# Patient Record
Sex: FEMALE | Race: WHITE | NOT HISPANIC OR LATINO | Employment: FULL TIME | URBAN - METROPOLITAN AREA
[De-identification: names, ages, dates, MRNs, and addresses within clinical notes are randomized per-mention and may not be internally consistent; named-entity substitution may affect disease eponyms.]

---

## 2017-02-01 ENCOUNTER — APPOINTMENT (OUTPATIENT)
Dept: WOMENS IMAGING | Facility: HOSPITAL | Age: 57
End: 2017-02-01

## 2017-02-01 PROCEDURE — 77067 SCR MAMMO BI INCL CAD: CPT | Performed by: RADIOLOGY

## 2018-02-19 ENCOUNTER — OFFICE (AMBULATORY)
Dept: URBAN - METROPOLITAN AREA CLINIC 66 | Facility: CLINIC | Age: 58
End: 2018-02-19

## 2018-02-19 VITALS
DIASTOLIC BLOOD PRESSURE: 90 MMHG | SYSTOLIC BLOOD PRESSURE: 153 MMHG | TEMPERATURE: 98.9 F | WEIGHT: 177 LBS | HEIGHT: 65 IN | HEART RATE: 98 BPM

## 2018-02-19 DIAGNOSIS — Q45.3 OTHER CONGENITAL MALFORMATIONS OF PANCREAS AND PANCREATIC DU: ICD-10-CM

## 2018-02-19 DIAGNOSIS — K58.9 IRRITABLE BOWEL SYNDROME WITHOUT DIARRHEA: ICD-10-CM

## 2018-02-19 DIAGNOSIS — K64.9 UNSPECIFIED HEMORRHOIDS: ICD-10-CM

## 2018-02-19 PROCEDURE — 99214 OFFICE O/P EST MOD 30 MIN: CPT

## 2018-02-21 RX ORDER — PRAMOXINE HYDROCHLORIDE HYDROCORTISONE ACETATE 100; 100 MG/10G; MG/10G
AEROSOL, FOAM TOPICAL
Qty: 21 | Refills: 10 | Status: COMPLETED
Start: 2018-02-21 | End: 2022-05-04

## 2018-04-13 ENCOUNTER — APPOINTMENT (OUTPATIENT)
Dept: WOMENS IMAGING | Facility: HOSPITAL | Age: 58
End: 2018-04-13

## 2018-04-13 PROCEDURE — 77067 SCR MAMMO BI INCL CAD: CPT | Performed by: RADIOLOGY

## 2018-04-13 PROCEDURE — 77063 BREAST TOMOSYNTHESIS BI: CPT | Performed by: RADIOLOGY

## 2018-08-01 ENCOUNTER — OFFICE (AMBULATORY)
Dept: URBAN - METROPOLITAN AREA CLINIC 66 | Facility: CLINIC | Age: 58
End: 2018-08-01

## 2018-08-01 VITALS
HEART RATE: 88 BPM | WEIGHT: 167 LBS | HEIGHT: 65 IN | SYSTOLIC BLOOD PRESSURE: 119 MMHG | DIASTOLIC BLOOD PRESSURE: 76 MMHG

## 2018-08-01 DIAGNOSIS — K86.1 OTHER CHRONIC PANCREATITIS: ICD-10-CM

## 2018-08-01 DIAGNOSIS — K58.0 IRRITABLE BOWEL SYNDROME WITH DIARRHEA: ICD-10-CM

## 2018-08-01 PROCEDURE — 99213 OFFICE O/P EST LOW 20 MIN: CPT

## 2019-01-03 ENCOUNTER — OFFICE (AMBULATORY)
Dept: URBAN - METROPOLITAN AREA CLINIC 66 | Facility: CLINIC | Age: 59
End: 2019-01-03

## 2019-01-03 VITALS
DIASTOLIC BLOOD PRESSURE: 74 MMHG | WEIGHT: 182 LBS | SYSTOLIC BLOOD PRESSURE: 114 MMHG | HEIGHT: 65 IN | HEART RATE: 97 BPM

## 2019-01-03 DIAGNOSIS — K58.0 IRRITABLE BOWEL SYNDROME WITH DIARRHEA: ICD-10-CM

## 2019-01-03 DIAGNOSIS — K86.1 OTHER CHRONIC PANCREATITIS: ICD-10-CM

## 2019-01-03 PROCEDURE — 99213 OFFICE O/P EST LOW 20 MIN: CPT

## 2019-04-26 ENCOUNTER — APPOINTMENT (OUTPATIENT)
Dept: WOMENS IMAGING | Facility: HOSPITAL | Age: 59
End: 2019-04-26

## 2019-04-26 PROCEDURE — 77067 SCR MAMMO BI INCL CAD: CPT | Performed by: RADIOLOGY

## 2019-04-26 PROCEDURE — 77063 BREAST TOMOSYNTHESIS BI: CPT | Performed by: RADIOLOGY

## 2019-07-10 ENCOUNTER — OFFICE (AMBULATORY)
Dept: URBAN - METROPOLITAN AREA CLINIC 66 | Facility: CLINIC | Age: 59
End: 2019-07-10

## 2019-07-10 VITALS
SYSTOLIC BLOOD PRESSURE: 129 MMHG | DIASTOLIC BLOOD PRESSURE: 82 MMHG | HEIGHT: 65 IN | HEART RATE: 93 BPM | WEIGHT: 192 LBS

## 2019-07-10 DIAGNOSIS — K86.1 OTHER CHRONIC PANCREATITIS: ICD-10-CM

## 2019-07-10 DIAGNOSIS — K58.0 IRRITABLE BOWEL SYNDROME WITH DIARRHEA: ICD-10-CM

## 2019-07-10 PROCEDURE — 99214 OFFICE O/P EST MOD 30 MIN: CPT

## 2019-12-13 ENCOUNTER — OFFICE (AMBULATORY)
Dept: URBAN - METROPOLITAN AREA CLINIC 66 | Facility: CLINIC | Age: 59
End: 2019-12-13

## 2019-12-13 VITALS
HEIGHT: 65 IN | DIASTOLIC BLOOD PRESSURE: 84 MMHG | SYSTOLIC BLOOD PRESSURE: 125 MMHG | HEART RATE: 84 BPM | WEIGHT: 189 LBS

## 2019-12-13 DIAGNOSIS — K86.1 OTHER CHRONIC PANCREATITIS: ICD-10-CM

## 2019-12-13 DIAGNOSIS — K21.9 GASTRO-ESOPHAGEAL REFLUX DISEASE WITHOUT ESOPHAGITIS: ICD-10-CM

## 2019-12-13 DIAGNOSIS — K58.0 IRRITABLE BOWEL SYNDROME WITH DIARRHEA: ICD-10-CM

## 2019-12-13 PROCEDURE — 99213 OFFICE O/P EST LOW 20 MIN: CPT

## 2020-02-27 ENCOUNTER — OFFICE (AMBULATORY)
Dept: URBAN - METROPOLITAN AREA CLINIC 64 | Facility: CLINIC | Age: 60
End: 2020-02-27

## 2020-02-27 VITALS
HEIGHT: 65 IN | DIASTOLIC BLOOD PRESSURE: 98 MMHG | HEART RATE: 95 BPM | SYSTOLIC BLOOD PRESSURE: 165 MMHG | WEIGHT: 187 LBS

## 2020-02-27 DIAGNOSIS — K86.1 OTHER CHRONIC PANCREATITIS: ICD-10-CM

## 2020-02-27 DIAGNOSIS — R19.7 DIARRHEA, UNSPECIFIED: ICD-10-CM

## 2020-02-27 DIAGNOSIS — K21.9 GASTRO-ESOPHAGEAL REFLUX DISEASE WITHOUT ESOPHAGITIS: ICD-10-CM

## 2020-02-27 PROCEDURE — 99214 OFFICE O/P EST MOD 30 MIN: CPT | Performed by: INTERNAL MEDICINE

## 2020-02-28 ENCOUNTER — HOSPITAL ENCOUNTER (OUTPATIENT)
Facility: HOSPITAL | Age: 60
Setting detail: HOSPITAL OUTPATIENT SURGERY
End: 2020-02-28
Attending: INTERNAL MEDICINE | Admitting: INTERNAL MEDICINE

## 2020-05-11 RX ORDER — DIVALPROEX SODIUM 250 MG/1
150 TABLET, DELAYED RELEASE ORAL
COMMUNITY
End: 2022-10-19 | Stop reason: SDUPTHER

## 2020-05-11 RX ORDER — ESTRADIOL 1 MG/1
1 TABLET ORAL DAILY
COMMUNITY

## 2020-05-11 RX ORDER — ESCITALOPRAM OXALATE 20 MG/1
20 TABLET ORAL DAILY
COMMUNITY

## 2020-05-11 RX ORDER — DIAPER,BRIEF,INFANT-TODD,DISP
1 EACH MISCELLANEOUS 2 TIMES DAILY
COMMUNITY

## 2020-05-11 RX ORDER — SACCHAROMYCES BOULARDII 250 MG
250 CAPSULE ORAL 3 TIMES DAILY
COMMUNITY

## 2020-05-11 RX ORDER — PANTOPRAZOLE SODIUM 40 MG/1
40 TABLET, DELAYED RELEASE ORAL DAILY
COMMUNITY

## 2020-05-11 RX ORDER — DICYCLOMINE HYDROCHLORIDE 10 MG/1
10 CAPSULE ORAL
COMMUNITY

## 2020-05-11 RX ORDER — CLONAZEPAM 0.5 MG/1
0.5 TABLET ORAL 2 TIMES DAILY PRN
COMMUNITY

## 2020-05-11 RX ORDER — PRAMIPEXOLE DIHYDROCHLORIDE 0.25 MG/1
0.25 TABLET ORAL 2 TIMES DAILY
COMMUNITY

## 2020-05-11 RX ORDER — DOXYCYCLINE HYCLATE 50 MG/1
50 CAPSULE ORAL DAILY
COMMUNITY
End: 2022-10-19

## 2020-05-11 RX ORDER — LEVOTHYROXINE SODIUM 0.1 MG/1
100 TABLET ORAL DAILY
COMMUNITY

## 2020-05-11 RX ORDER — MAGNESIUM GLUCONATE 27 MG(500)
27 TABLET ORAL 2 TIMES DAILY
COMMUNITY

## 2020-05-12 RX ORDER — DEXTROAMPHETAMINE SACCHARATE, AMPHETAMINE ASPARTATE, DEXTROAMPHETAMINE SULFATE AND AMPHETAMINE SULFATE 5; 5; 5; 5 MG/1; MG/1; MG/1; MG/1
20 TABLET ORAL 2 TIMES DAILY
COMMUNITY

## 2020-05-12 RX ORDER — GLUCOSAMINE/D3/BOSWELLIA SERRA 1500MG-400
TABLET ORAL
COMMUNITY

## 2020-05-12 RX ORDER — SODIUM PHOSPHATE,MONO-DIBASIC 19G-7G/118
2 ENEMA (ML) RECTAL
COMMUNITY

## 2020-05-12 RX ORDER — MULTIVIT WITH MINERALS/LUTEIN
1000 TABLET ORAL DAILY
COMMUNITY

## 2020-05-15 ENCOUNTER — APPOINTMENT (OUTPATIENT)
Dept: LAB | Facility: HOSPITAL | Age: 60
End: 2020-05-15

## 2020-05-15 ENCOUNTER — LAB (OUTPATIENT)
Dept: LAB | Facility: HOSPITAL | Age: 60
End: 2020-05-15

## 2020-05-15 PROCEDURE — C9803 HOPD COVID-19 SPEC COLLECT: HCPCS

## 2020-05-15 PROCEDURE — U0004 COV-19 TEST NON-CDC HGH THRU: HCPCS

## 2020-05-16 LAB
REF LAB TEST METHOD: NORMAL
SARS-COV-2 RNA RESP QL NAA+PROBE: NOT DETECTED

## 2020-05-18 ENCOUNTER — ON CAMPUS - OUTPATIENT (AMBULATORY)
Dept: URBAN - METROPOLITAN AREA HOSPITAL 85 | Facility: HOSPITAL | Age: 60
End: 2020-05-18

## 2020-05-18 ENCOUNTER — ANESTHESIA EVENT (OUTPATIENT)
Dept: GASTROENTEROLOGY | Facility: HOSPITAL | Age: 60
End: 2020-05-18

## 2020-05-18 ENCOUNTER — HOSPITAL ENCOUNTER (OUTPATIENT)
Facility: HOSPITAL | Age: 60
Setting detail: HOSPITAL OUTPATIENT SURGERY
Discharge: HOME | End: 2020-05-18
Attending: INTERNAL MEDICINE | Admitting: INTERNAL MEDICINE

## 2020-05-18 ENCOUNTER — ANESTHESIA (OUTPATIENT)
Dept: GASTROENTEROLOGY | Facility: HOSPITAL | Age: 60
End: 2020-05-18

## 2020-05-18 VITALS
RESPIRATION RATE: 15 BRPM | HEIGHT: 66 IN | TEMPERATURE: 98.7 F | BODY MASS INDEX: 34.51 KG/M2 | WEIGHT: 214.73 LBS | DIASTOLIC BLOOD PRESSURE: 70 MMHG | SYSTOLIC BLOOD PRESSURE: 121 MMHG | OXYGEN SATURATION: 100 % | HEART RATE: 67 BPM

## 2020-05-18 DIAGNOSIS — K86.1 OTHER CHRONIC PANCREATITIS: ICD-10-CM

## 2020-05-18 DIAGNOSIS — K29.50 UNSPECIFIED CHRONIC GASTRITIS WITHOUT BLEEDING: ICD-10-CM

## 2020-05-18 DIAGNOSIS — K86.1 CHRONIC PANCREATITIS (HCC): ICD-10-CM

## 2020-05-18 DIAGNOSIS — K21.9 GASTRO-ESOPHAGEAL REFLUX DISEASE WITHOUT ESOPHAGITIS: ICD-10-CM

## 2020-05-18 DIAGNOSIS — K21.9 GERD (GASTROESOPHAGEAL REFLUX DISEASE): ICD-10-CM

## 2020-05-18 PROCEDURE — 88305 TISSUE EXAM BY PATHOLOGIST: CPT | Performed by: INTERNAL MEDICINE

## 2020-05-18 PROCEDURE — 25010000002 PROPOFOL 10 MG/ML EMULSION: Performed by: ANESTHESIOLOGY

## 2020-05-18 PROCEDURE — 43239 EGD BIOPSY SINGLE/MULTIPLE: CPT | Performed by: INTERNAL MEDICINE

## 2020-05-18 RX ORDER — PROPOFOL 10 MG/ML
VIAL (ML) INTRAVENOUS AS NEEDED
Status: DISCONTINUED | OUTPATIENT
Start: 2020-05-18 | End: 2020-05-18 | Stop reason: SURG

## 2020-05-18 RX ORDER — SODIUM CHLORIDE 0.9 % (FLUSH) 0.9 %
10 SYRINGE (ML) INJECTION AS NEEDED
Status: DISCONTINUED | OUTPATIENT
Start: 2020-05-18 | End: 2020-05-18 | Stop reason: HOSPADM

## 2020-05-18 RX ORDER — SODIUM CHLORIDE 0.9 % (FLUSH) 0.9 %
10 SYRINGE (ML) INJECTION EVERY 12 HOURS SCHEDULED
Status: DISCONTINUED | OUTPATIENT
Start: 2020-05-18 | End: 2020-05-18 | Stop reason: HOSPADM

## 2020-05-18 RX ORDER — SODIUM CHLORIDE 9 MG/ML
9 INJECTION, SOLUTION INTRAVENOUS CONTINUOUS
Status: DISCONTINUED | OUTPATIENT
Start: 2020-05-18 | End: 2020-05-18 | Stop reason: HOSPADM

## 2020-05-18 RX ORDER — SODIUM CHLORIDE 0.9 % (FLUSH) 0.9 %
3 SYRINGE (ML) INJECTION EVERY 12 HOURS SCHEDULED
Status: DISCONTINUED | OUTPATIENT
Start: 2020-05-18 | End: 2020-05-18 | Stop reason: HOSPADM

## 2020-05-18 RX ORDER — ONDANSETRON 2 MG/ML
4 INJECTION INTRAMUSCULAR; INTRAVENOUS ONCE AS NEEDED
Status: DISCONTINUED | OUTPATIENT
Start: 2020-05-18 | End: 2020-05-18 | Stop reason: HOSPADM

## 2020-05-18 RX ADMIN — PROPOFOL 150 MG: 10 INJECTION, EMULSION INTRAVENOUS at 11:03

## 2020-05-18 RX ADMIN — PROPOFOL 20 MG: 10 INJECTION, EMULSION INTRAVENOUS at 11:08

## 2020-05-18 RX ADMIN — SODIUM CHLORIDE 9 ML/HR: 0.9 INJECTION, SOLUTION INTRAVENOUS at 09:16

## 2020-05-18 RX ADMIN — PROPOFOL 50 MG: 10 INJECTION, EMULSION INTRAVENOUS at 11:05

## 2020-05-18 NOTE — ANESTHESIA POSTPROCEDURE EVALUATION
Patient: Mari Whitman    Procedure Summary     Date:  05/18/20 Room / Location:  [facility] ENDOSCOPY 1 / [facility] ENDOSCOPY    Anesthesia Start:  1057 Anesthesia Stop:  1111    Procedure:  ESOPHAGOGASTRODUODENOSCOPY with biopsy x 2 areas (N/A ) Diagnosis:       Chronic pancreatitis (CMS/HCC)      GERD (gastroesophageal reflux disease)      (CHRONIC PANCREATITIS, GERD)    Surgeon:  Brad Bishop MD Provider:  Graham Marsh MD    Anesthesia Type:  MAC ASA Status:  3          Anesthesia Type: MAC    Vitals  Vitals Value Taken Time   BP     Temp     Pulse 69 5/18/2020 11:13 AM   Resp     SpO2 98 % 5/18/2020 11:13 AM   Vitals shown include unvalidated device data.        Post Anesthesia Care and Evaluation    Patient location during evaluation: PACU  Patient participation: complete - patient participated  Level of consciousness: awake  Pain scale: See nurse's notes for pain score.  Pain management: adequate  Airway patency: patent  Anesthetic complications: No anesthetic complications  PONV Status: none  Cardiovascular status: acceptable  Respiratory status: acceptable  Hydration status: acceptable    Comments: Patient seen and examined postoperatively; vital signs stable; SpO2 greater than or equal to 90%; cardiopulmonary status stable; nausea/vomiting adequately controlled; pain adequately controlled; no apparent anesthesia complications; patient discharged from anesthesia care when discharge criteria were met

## 2020-05-18 NOTE — H&P
GI CONSULT  NOTE:    Referring Provider:    Kiley Cobian MD  [unfilled]    Chief complaint: <principal problem not specified>    Subjective .       Pre op diagnosis  CHRONIC PANCREATITIS, GERD      History of present illness:      Mari Whitman is a 59 y.o. female who presents today for Procedure(s):  ESOPHAGOGASTRODUODENOSCOPY for the indications listed below.     The updated Patient Profile was reviewed prior to the procedure, in conjunction with the Physical Exam, including medical conditions, surgical procedures, medications, allergies, family history and social history.     Pre-operatively, I reviewed the indication(s) for the procedure, the risks of the procedure [including but not limited to: unexpected bleeding possibly requiring hospitalization and/or unplanned repeat procedures, perforation possibly requiring surgical treatment, missed lesions and complications of sedation/MAC (also explained by anesthesia staff)].     I have evaluated the patient for risks associated with the planned anesthesia and the procedure to be performed and find the patient an acceptable candidate for IV sedation.    Multiple opportunities were provided for any questions or concerns, and all questions were answered satisfactorily before any anesthesia was administered. We will proceed with the planned procedure.    Past Medical History:  Past Medical History:   Diagnosis Date   • Borderline diabetes    • Cancer (CMS/HCC)     cervix   • GERD (gastroesophageal reflux disease)    • Idiopathic thrombocytopenic purpura (ITP) (CMS/HCC)    • Pancreatic divisum    • PONV (postoperative nausea and vomiting)    • Sleep apnea        Past Surgical History:  Past Surgical History:   Procedure Laterality Date   • ANAL SPHINCTEROPLASTY     • CHOLECYSTECTOMY     • COLONOSCOPY     • ERCP     • HYSTERECTOMY      2000   • NASAL SEPTUM SURGERY         Social History:  Social History     Tobacco Use   • Smoking status: Former Smoker      Last attempt to quit: 1985     Years since quittin.4   • Smokeless tobacco: Current User   Substance Use Topics   • Alcohol use: Not Currently   • Drug use: Never       Family History:  History reviewed. No pertinent family history.    Medications:  Medications Prior to Admission   Medication Sig Dispense Refill Last Dose   • Acetylcysteine (N-ACETYL-L-CYSTEINE PO) Take 1,200 mg by mouth Daily.   2020 at Unknown time   • amphetamine-dextroamphetamine (ADDERALL) 20 MG tablet Take 20 mg by mouth 2 (Two) Times a Day.   2020 at Unknown time   • Ascorbic Acid (C-500/CLARITZA HIPS PO) Take 1 capsule by mouth Daily.   2020   • Biotin 71584 MCG tablet Take  by mouth.   2020   • Ca Phosphate-Cholecalciferol (CALTRATE GUMMY BITES PO) Take 2 tablets by mouth every night at bedtime.   2020   • clonazePAM (KlonoPIN) 0.5 MG tablet Take 0.5 mg by mouth 2 (Two) Times a Day As Needed for Seizures. May take dos   2020 at Unknown time   • dicyclomine (BENTYL) 10 MG capsule Take 10 mg by mouth 4 (Four) Times a Day Before Meals & at Bedtime.   2020 at Unknown time   • divalproex (DEPAKOTE) 250 MG DR tablet Take 150 mg by mouth every night at bedtime.   2020 at Unknown time   • doxycycline (VIBRAMYCIN) 50 MG capsule Take 50 mg by mouth Daily.   2020 at Unknown time   • escitalopram (LEXAPRO) 20 MG tablet Take 20 mg by mouth Daily. Take dos   2020 at Unknown time   • estradiol (ESTRACE) 1 MG tablet Take 1 mg by mouth Daily.   2020 at Unknown time   • Ginger, Zingiber officinalis, (GINGER PO) Take 12 mg by mouth Daily.   2020   • glucosamine-chondroitin 500-400 MG capsule capsule Take 2 capsules by mouth every night at bedtime.   2020   • hydrocortisone 0.5 % cream Apply 1 application topically to the appropriate area as directed 2 (Two) Times a Day.   Past Month at Unknown time   • levothyroxine (SYNTHROID, LEVOTHROID) 100 MCG tablet Take 100 mcg by mouth Daily. Take dos    5/18/2020 at Unknown time   • magnesium gluconate (MAGONATE) 500 MG tablet Take 27 mg by mouth 2 (Two) Times a Day.   5/17/2020   • methylcellulose oral powder Take 5 g by mouth 4 (Four) Times a Day.   5/17/2020 at Unknown time   • Multiple Vitamins-Minerals (CENTRUM MULTIGUMMIES PO) Take 2 tablets by mouth every night at bedtime.   5/16/2020   • Multiple Vitamins-Minerals (ECHINACEA ACZ PO) Take 25 mg by mouth Daily.   5/16/2020   • Pancrelipase, Lip-Prot-Amyl, (CREON PO) Take 3,600 mg by mouth Daily.   5/17/2020 at Unknown time   • pantoprazole (PROTONIX) 40 MG EC tablet Take 40 mg by mouth Daily. Ok dos   5/17/2020 at Unknown time   • Peppermint Oil (IBGARD PO) Take 3 capsules by mouth Daily.   5/16/2020   • pramipexole (MIRAPEX) 0.25 MG tablet Take 0.25 mg by mouth 2 (Two) Times a Day.   5/17/2020 at Unknown time   • progesterone (PROMETRIUM) 100 MG capsule Take 100 mg by mouth Daily.   5/18/2020 at Unknown time   • saccharomyces boulardii (FLORASTOR) 250 MG capsule Take 250 mg by mouth 3 (Three) Times a Day.   5/18/2020 at Unknown time   • Suvorexant (Belsomra) 20 MG tablet Take 1 tablet by mouth At Night As Needed.   Past Week at Unknown time   • TURMERIC PO Take 270 mg by mouth Daily.   5/16/2020   • vitamin E 1000 UNIT capsule Take 1,000 Units by mouth Daily.   5/16/2020   • Zinc Sulfate (ZINC 15 PO) Take 1 tablet by mouth Daily.   5/16/2020       Scheduled Meds:   Continuous Infusions:  sodium chloride 9 mL/hr Last Rate: 9 mL/hr (05/18/20 0916)     PRN Meds:.    ALLERGIES:  Codeine; Droperidol; and Hydrocodone    ROS:  The following systems were reviewed and negative;  Constitution:  No fevers, chills, no unintentional weight loss  Skin: no rash, no jaundice  Eyes:  No blurry vision, no eye pain  HENT:  No change in hearing or smell  Resp:  No dyspnea or cough  CV:  No chest pain or palpitations  :  No dysuria, hematuria  Musculoskeletal:  No leg cramps or arthralgias  Neuro:  No tremor, no  "numbness  Psych:  No depression or confsuion    Objective     Vital Signs:   Vitals:    05/11/20 1042 05/18/20 0855   BP:  142/77   Pulse:  72   Resp:  13   Temp:  98.7 °F (37.1 °C)   TempSrc:  Oral   SpO2:  96%   Weight: 83.9 kg (185 lb) 97.4 kg (214 lb 11.7 oz)   Height: 167.6 cm (66\") 167.6 cm (66\")       Physical Exam:       General Appearance:    Awake and alert, in no acute distress   Head:    Normocephalic, without obvious abnormality, atraumatic   Throat:   No oral lesions, no thrush, oral mucosa moist   Lungs:     respirations regular, even and unlabored   Skin:   No rash, no jaundice       Results Review:  Lab Results (last 24 hours)     ** No results found for the last 24 hours. **          Imaging Results (Last 24 Hours)     ** No results found for the last 24 hours. **           I reviewed the patient's labs and imaging.    ASSESSMENT AND PLAN:      Active Problems:    * No active hospital problems. *       Procedure(s):  ESOPHAGOGASTRODUODENOSCOPY      I discussed the patients findings and my recommendations with the patient.    Brad Bishop MD  05/18/20  11:00              "

## 2020-05-18 NOTE — OP NOTE
ESOPHAGOGASTRODUODENOSCOPY Procedure Report    Patient Name:  Mari Whitman  YOB: 1960    Date of Surgery:  5/18/2020     Pre-Op Diagnosis:  CHRONIC PANCREATITIS, GERD       Post-Op Diagnosis Codes:  Gastritis    Procedure/CPT® Codes:      Procedure(s):  ESOPHAGOGASTRODUODENOSCOPY with biopsy x 2 areas    Staff:  Surgeon(s):  Brad Bishop MD      Anesthesia: Monitored Anesthesia Care    Description of Procedure:  A description of the procedure as well as risks, benefits and alternative methods were explained to the patient who voiced understanding and signed the corresponding consent form. A physical exam was performed and vital signs were monitored throughout the procedure.    An upper GI endoscope was placed into the mouth and proceeded through the esophagus, stomach and second portion of the duodenum without difficulty. The scope was then retroflexed and the fundus was visualized. The procedure was not difficult and there were no immediate complications.    Impression:  1.  Normal esophageal mucosa entire esophagus  2.  Erythema and erosions in the distal gastric body, consistent with gastritis, cold forcep biopsies were taken and sent for histopathology and H. pylori  3.  Normal duodenal mucosa visualized to D3, cold forcep biopsies were taken and sent for histopathology    Recommendations:  Follow-up biopsy results  Treat H. pylori if positive  Follow-up in clinic      Brad Bishop MD     Date: 5/18/2020    Time: 11:31

## 2020-05-18 NOTE — ANESTHESIA PREPROCEDURE EVALUATION
Anesthesia Evaluation     Patient summary reviewed and Nursing notes reviewed   history of anesthetic complications: PONV  NPO Solid Status: > 8 hours  NPO Liquid Status: > 8 hours           Airway   Mallampati: II  TM distance: >3 FB  Neck ROM: full  No difficulty expected  Dental - normal exam     Pulmonary - normal exam   (+) sleep apnea on CPAP,   Cardiovascular - negative cardio ROS and normal exam        Neuro/Psych- negative ROS  GI/Hepatic/Renal/Endo    (+) obesity,  GERD,      Musculoskeletal (-) negative ROS    Abdominal  - normal exam    Bowel sounds: normal.   Substance History - negative use     OB/GYN negative ob/gyn ROS         Other   blood dyscrasia thrombocytopenia,                     Anesthesia Plan    ASA 3     MAC     intravenous induction     Anesthetic plan, all risks, benefits, and alternatives have been provided, discussed and informed consent has been obtained with: patient.

## 2020-05-18 NOTE — DISCHARGE INSTRUCTIONS
A responsible adult should stay with you and you should rest quietly for the rest of the day.    Do not drink alcohol, drive, operate any heavy machinery or power tools or make any legal/important decisions for the next 24 hours.     Progress your diet as tolerated.  If you begin to experience severe pain, increased shortness of breath, racing heartbeat or a fever above 101 F, seek immediate medical attention.     Follow up with MD as instructed. Call office for results in 3 to 5 days if needed.[phone]    Follow-up biopsy results  Treat H. pylori if positive  Follow-up in clinic

## 2020-05-19 LAB
LAB AP CASE REPORT: NORMAL
PATH REPORT.FINAL DX SPEC: NORMAL
PATH REPORT.GROSS SPEC: NORMAL

## 2020-05-28 ENCOUNTER — OFFICE (AMBULATORY)
Dept: URBAN - METROPOLITAN AREA CLINIC 64 | Facility: CLINIC | Age: 60
End: 2020-05-28

## 2020-05-28 VITALS
DIASTOLIC BLOOD PRESSURE: 97 MMHG | HEART RATE: 93 BPM | SYSTOLIC BLOOD PRESSURE: 143 MMHG | HEIGHT: 65 IN | WEIGHT: 190 LBS

## 2020-05-28 DIAGNOSIS — K86.1 OTHER CHRONIC PANCREATITIS: ICD-10-CM

## 2020-05-28 DIAGNOSIS — K21.9 GASTRO-ESOPHAGEAL REFLUX DISEASE WITHOUT ESOPHAGITIS: ICD-10-CM

## 2020-05-28 DIAGNOSIS — R19.7 DIARRHEA, UNSPECIFIED: ICD-10-CM

## 2020-05-28 DIAGNOSIS — K30 FUNCTIONAL DYSPEPSIA: ICD-10-CM

## 2020-05-28 PROCEDURE — 99213 OFFICE O/P EST LOW 20 MIN: CPT | Performed by: INTERNAL MEDICINE

## 2020-05-28 RX ORDER — SIMETHICONE 125 MG/1
TABLET, CHEWABLE ORAL
Qty: 90 | Refills: 1 | Status: ACTIVE

## 2020-07-09 ENCOUNTER — APPOINTMENT (OUTPATIENT)
Dept: WOMENS IMAGING | Facility: HOSPITAL | Age: 60
End: 2020-07-09

## 2020-07-09 PROCEDURE — 77063 BREAST TOMOSYNTHESIS BI: CPT | Performed by: RADIOLOGY

## 2020-07-09 PROCEDURE — 77067 SCR MAMMO BI INCL CAD: CPT | Performed by: RADIOLOGY

## 2021-04-15 ENCOUNTER — OFFICE (AMBULATORY)
Dept: URBAN - METROPOLITAN AREA CLINIC 64 | Facility: CLINIC | Age: 61
End: 2021-04-15

## 2021-04-15 VITALS
HEIGHT: 65 IN | HEART RATE: 107 BPM | SYSTOLIC BLOOD PRESSURE: 142 MMHG | DIASTOLIC BLOOD PRESSURE: 94 MMHG | WEIGHT: 195 LBS

## 2021-04-15 DIAGNOSIS — Q45.3 OTHER CONGENITAL MALFORMATIONS OF PANCREAS AND PANCREATIC DU: ICD-10-CM

## 2021-04-15 DIAGNOSIS — R11.2 NAUSEA WITH VOMITING, UNSPECIFIED: ICD-10-CM

## 2021-04-15 DIAGNOSIS — K30 FUNCTIONAL DYSPEPSIA: ICD-10-CM

## 2021-04-15 DIAGNOSIS — K86.1 OTHER CHRONIC PANCREATITIS: ICD-10-CM

## 2021-04-15 DIAGNOSIS — K58.0 IRRITABLE BOWEL SYNDROME WITH DIARRHEA: ICD-10-CM

## 2021-04-15 PROCEDURE — 99214 OFFICE O/P EST MOD 30 MIN: CPT | Performed by: INTERNAL MEDICINE

## 2021-04-15 RX ORDER — DICYCLOMINE HYDROCHLORIDE 20 MG/1
TABLET ORAL
Qty: 60 | Refills: 1 | Status: ACTIVE

## 2021-04-15 RX ORDER — SIMETHICONE 125 MG/1
TABLET, CHEWABLE ORAL
Qty: 90 | Refills: 1 | Status: ACTIVE

## 2022-03-15 ENCOUNTER — APPOINTMENT (OUTPATIENT)
Dept: WOMENS IMAGING | Facility: HOSPITAL | Age: 62
End: 2022-03-15

## 2022-03-15 PROCEDURE — 77067 SCR MAMMO BI INCL CAD: CPT | Performed by: RADIOLOGY

## 2022-03-15 PROCEDURE — 77063 BREAST TOMOSYNTHESIS BI: CPT | Performed by: RADIOLOGY

## 2022-05-04 ENCOUNTER — OFFICE (AMBULATORY)
Dept: URBAN - METROPOLITAN AREA CLINIC 64 | Facility: CLINIC | Age: 62
End: 2022-05-04

## 2022-05-04 VITALS
WEIGHT: 196 LBS | HEIGHT: 65 IN | SYSTOLIC BLOOD PRESSURE: 163 MMHG | HEART RATE: 97 BPM | DIASTOLIC BLOOD PRESSURE: 104 MMHG

## 2022-05-04 DIAGNOSIS — K58.0 IRRITABLE BOWEL SYNDROME WITH DIARRHEA: ICD-10-CM

## 2022-05-04 PROCEDURE — 99214 OFFICE O/P EST MOD 30 MIN: CPT | Performed by: INTERNAL MEDICINE

## 2022-06-09 ENCOUNTER — OFFICE (AMBULATORY)
Dept: URBAN - METROPOLITAN AREA PATHOLOGY 4 | Facility: PATHOLOGY | Age: 62
End: 2022-06-09
Payer: COMMERCIAL

## 2022-06-09 ENCOUNTER — ON CAMPUS - OUTPATIENT (AMBULATORY)
Dept: URBAN - METROPOLITAN AREA HOSPITAL 2 | Facility: HOSPITAL | Age: 62
End: 2022-06-09
Payer: COMMERCIAL

## 2022-06-09 VITALS
DIASTOLIC BLOOD PRESSURE: 81 MMHG | SYSTOLIC BLOOD PRESSURE: 103 MMHG | DIASTOLIC BLOOD PRESSURE: 70 MMHG | SYSTOLIC BLOOD PRESSURE: 141 MMHG | HEART RATE: 69 BPM | DIASTOLIC BLOOD PRESSURE: 62 MMHG | DIASTOLIC BLOOD PRESSURE: 53 MMHG | TEMPERATURE: 96.4 F | SYSTOLIC BLOOD PRESSURE: 136 MMHG | DIASTOLIC BLOOD PRESSURE: 59 MMHG | SYSTOLIC BLOOD PRESSURE: 109 MMHG | SYSTOLIC BLOOD PRESSURE: 106 MMHG | DIASTOLIC BLOOD PRESSURE: 56 MMHG | HEART RATE: 68 BPM | HEART RATE: 81 BPM | RESPIRATION RATE: 18 BRPM | OXYGEN SATURATION: 96 % | SYSTOLIC BLOOD PRESSURE: 104 MMHG | WEIGHT: 193 LBS | DIASTOLIC BLOOD PRESSURE: 63 MMHG | HEIGHT: 66 IN | DIASTOLIC BLOOD PRESSURE: 84 MMHG | HEART RATE: 75 BPM | HEART RATE: 70 BPM | HEART RATE: 73 BPM | HEART RATE: 74 BPM | DIASTOLIC BLOOD PRESSURE: 57 MMHG | RESPIRATION RATE: 16 BRPM | SYSTOLIC BLOOD PRESSURE: 143 MMHG | OXYGEN SATURATION: 98 % | OXYGEN SATURATION: 95 % | SYSTOLIC BLOOD PRESSURE: 96 MMHG | OXYGEN SATURATION: 97 % | SYSTOLIC BLOOD PRESSURE: 108 MMHG | OXYGEN SATURATION: 100 % | OXYGEN SATURATION: 99 %

## 2022-06-09 DIAGNOSIS — K57.30 DIVERTICULOSIS OF LARGE INTESTINE WITHOUT PERFORATION OR ABS: ICD-10-CM

## 2022-06-09 DIAGNOSIS — Z86.010 PERSONAL HISTORY OF COLONIC POLYPS: ICD-10-CM

## 2022-06-09 DIAGNOSIS — K62.1 RECTAL POLYP: ICD-10-CM

## 2022-06-09 DIAGNOSIS — D12.3 BENIGN NEOPLASM OF TRANSVERSE COLON: ICD-10-CM

## 2022-06-09 DIAGNOSIS — D12.8 BENIGN NEOPLASM OF RECTUM: ICD-10-CM

## 2022-06-09 LAB
GI HISTOLOGY: A. UNSPECIFIED: (no result)
GI HISTOLOGY: B. UNSPECIFIED: (no result)
GI HISTOLOGY: PDF REPORT: (no result)

## 2022-06-09 PROCEDURE — 45385 COLONOSCOPY W/LESION REMOVAL: CPT | Mod: 33 | Performed by: INTERNAL MEDICINE

## 2022-06-09 PROCEDURE — 88305 TISSUE EXAM BY PATHOLOGIST: CPT | Performed by: INTERNAL MEDICINE

## 2022-06-14 PROBLEM — K62.1 RECTAL POLYP: Status: ACTIVE | Noted: 2022-06-09

## 2022-06-14 PROBLEM — K63.5 POLYP OF COLON: Status: ACTIVE | Noted: 2022-06-09

## 2022-10-17 NOTE — PROGRESS NOTES
"Chief Complaint  NEW PATIENT (P[facility]INALDO)    Subjective          Mari Whitman presents to [facility] NEUROLOGY  History of Present Illness    The patient c/o daytime sleepiness issues:  chronic fatigue.      Pt has insomnia, difficulty falling and staying asleep     There is no history of hypnagogic hallucinations, sleep paralysis or cataplexy.    The patient complains of snoring loud in all sleeping positions.    No restless leg syndrome , feels hot all the time     Sleep schedule: Bedtime:  MIDNIGHT -1 AM , gets out of bed at 630-7 am, sleep latency: over an hour , Gets about 5-6 hours of sleep.    EPWORTH SLEEPINESS SCALE  Sitting and reading 3 WatchingTV 2  Sitting, inactive, in a public place 1  As a passenger in a car for 1 hour w/o a break  2  Lying down to rest in the afternoon  3  Sitting and talking to someone  0  Sitting quietly after a lunch  1  In a car, while stopped for traffic or a light  0  Total 12    Review of Systems   Constitutional: Positive for fatigue.   HENT: Positive for postnasal drip, sinus pressure and sneezing.    Respiratory: Positive for shortness of breath.    Gastrointestinal: Positive for nausea.   Musculoskeletal: Positive for back pain.   Allergic/Immunologic: Positive for environmental allergies.   Psychiatric/Behavioral: Positive for sleep disturbance.   All other systems reviewed and are negative.      Objective   Vital Signs:   /83   Pulse 93   Temp 98.1 °F (36.7 °C) (Infrared)   Ht 165.1 cm (65\")   Wt 91.6 kg (202 lb)   BMI 33.61 kg/m²     Physical Exam  Vitals reviewed.   HENT:      Head: Normocephalic.      Nose: Nose normal.   Cardiovascular:      Pulses: Normal pulses.   Pulmonary:      Effort: Pulmonary effort is normal. No respiratory distress.   Neurological:      General: No focal deficit present.      Mental Status: She is alert and oriented to person, place, and time.   Psychiatric:         Mood and Affect: Mood normal.         " Behavior: Behavior normal.        Result Review :                 Assessment and Plan    Diagnoses and all orders for this visit:    1. Obstructive sleep apnea (Primary)    2. Other insomnia      Snoring and difficulty staying asleep, will obtain hst and treat with pap if indicated  Continue medication from psychiatrist for bipolar and insomnia    Follow Up   Return in about 3 months (around 1/19/2023).  Patient was given instructions and counseling regarding her condition or for health maintenance advice. Please see specific information pulled into the AVS if appropriate.       This document has been electronically signed by Joseph Seipel, MD on October 19, 2022 15:14 EDT

## 2022-10-19 ENCOUNTER — OFFICE VISIT (OUTPATIENT)
Dept: NEUROLOGY | Facility: CLINIC | Age: 62
End: 2022-10-19

## 2022-10-19 VITALS
WEIGHT: 202 LBS | HEIGHT: 65 IN | BODY MASS INDEX: 33.66 KG/M2 | TEMPERATURE: 98.1 F | SYSTOLIC BLOOD PRESSURE: 128 MMHG | HEART RATE: 93 BPM | DIASTOLIC BLOOD PRESSURE: 83 MMHG

## 2022-10-19 DIAGNOSIS — G47.09 OTHER INSOMNIA: ICD-10-CM

## 2022-10-19 DIAGNOSIS — G47.33 OBSTRUCTIVE SLEEP APNEA: Primary | ICD-10-CM

## 2022-10-19 PROBLEM — Z82.3 FAMILY HISTORY OF CEREBROVASCULAR ACCIDENT (CVA): Status: ACTIVE | Noted: 2022-10-19

## 2022-10-19 PROBLEM — K21.9 GASTROESOPHAGEAL REFLUX DISEASE: Status: ACTIVE | Noted: 2022-10-19

## 2022-10-19 PROBLEM — F31.9 BIPOLAR AFFECTIVE DISORDER: Status: ACTIVE | Noted: 2022-10-19

## 2022-10-19 PROBLEM — E03.9 HYPOTHYROIDISM: Status: ACTIVE | Noted: 2022-10-19

## 2022-10-19 PROBLEM — Z81.8 FAMILY HISTORY OF DEPRESSION: Status: ACTIVE | Noted: 2022-10-19

## 2022-10-19 PROBLEM — C53.9 CERVICAL CANCER: Status: ACTIVE | Noted: 2022-10-19

## 2022-10-19 PROBLEM — M54.9 CHRONIC BACK PAIN: Status: ACTIVE | Noted: 2022-10-19

## 2022-10-19 PROBLEM — E78.5 HYPERLIPIDEMIA: Status: ACTIVE | Noted: 2022-10-19

## 2022-10-19 PROBLEM — F41.9 ANXIETY DISORDER: Status: ACTIVE | Noted: 2022-10-19

## 2022-10-19 PROBLEM — G89.29 CHRONIC BACK PAIN: Status: ACTIVE | Noted: 2022-10-19

## 2022-10-19 PROBLEM — E66.9 OBESITY: Status: ACTIVE | Noted: 2022-10-19

## 2022-10-19 PROCEDURE — 99204 OFFICE O/P NEW MOD 45 MIN: CPT | Performed by: PSYCHIATRY & NEUROLOGY

## 2022-10-19 RX ORDER — GABAPENTIN 300 MG/1
CAPSULE ORAL
COMMUNITY
End: 2022-10-19

## 2022-10-19 RX ORDER — VALACYCLOVIR HYDROCHLORIDE 500 MG/1
500 TABLET, FILM COATED ORAL DAILY
COMMUNITY

## 2022-10-19 RX ORDER — ROSUVASTATIN CALCIUM 10 MG/1
TABLET, COATED ORAL
COMMUNITY
Start: 2022-10-08

## 2022-10-19 RX ORDER — LEVOTHYROXINE SODIUM 0.1 MG/1
TABLET ORAL
COMMUNITY
Start: 2014-06-02 | End: 2022-10-19 | Stop reason: SDUPTHER

## 2022-10-19 RX ORDER — PROGESTERONE 100 MG/1
CAPSULE ORAL
COMMUNITY
Start: 2022-10-08 | End: 2022-10-19 | Stop reason: SDUPTHER

## 2022-10-19 RX ORDER — DIVALPROEX SODIUM 500 MG/1
TABLET, DELAYED RELEASE ORAL
COMMUNITY
End: 2022-10-19 | Stop reason: SDUPTHER

## 2022-10-19 RX ORDER — DICYCLOMINE HCL 20 MG
TABLET ORAL
COMMUNITY
Start: 2022-10-08 | End: 2022-10-19 | Stop reason: SDUPTHER

## 2022-10-19 RX ORDER — CLONAZEPAM 0.5 MG/1
0.5 TABLET ORAL
COMMUNITY
Start: 2014-06-02 | End: 2022-10-19 | Stop reason: SDUPTHER

## 2022-10-19 RX ORDER — RISPERIDONE 0.25 MG/1
TABLET ORAL
COMMUNITY
Start: 2014-06-02 | End: 2022-10-19 | Stop reason: SDUPTHER

## 2022-10-19 RX ORDER — PHENOL 1.4 %
AEROSOL, SPRAY (ML) MUCOUS MEMBRANE
COMMUNITY

## 2022-10-19 RX ORDER — PANTOPRAZOLE SODIUM 40 MG/1
TABLET, DELAYED RELEASE ORAL
COMMUNITY
Start: 2014-06-02 | End: 2022-10-19 | Stop reason: SDUPTHER

## 2022-11-07 ENCOUNTER — APPOINTMENT (OUTPATIENT)
Dept: SLEEP MEDICINE | Facility: HOSPITAL | Age: 62
End: 2022-11-07

## 2022-11-15 ENCOUNTER — HOSPITAL ENCOUNTER (OUTPATIENT)
Dept: SLEEP MEDICINE | Facility: HOSPITAL | Age: 62
Discharge: HOME | End: 2022-11-15
Admitting: PSYCHIATRY & NEUROLOGY

## 2022-11-15 DIAGNOSIS — G47.33 OBSTRUCTIVE SLEEP APNEA: ICD-10-CM

## 2022-11-15 PROCEDURE — 95806 SLEEP STUDY UNATT&RESP EFFT: CPT | Performed by: PSYCHIATRY & NEUROLOGY

## 2022-11-15 PROCEDURE — 95806 SLEEP STUDY UNATT&RESP EFFT: CPT

## 2022-12-30 ENCOUNTER — TELEPHONE (OUTPATIENT)
Dept: NEUROLOGY | Facility: CLINIC | Age: 62
End: 2022-12-30

## 2022-12-30 DIAGNOSIS — G47.33 OBSTRUCTIVE SLEEP APNEA: Primary | ICD-10-CM

## 2023-05-10 ENCOUNTER — CLINICAL SUPPORT (OUTPATIENT)
Dept: GENETICS | Facility: HOSPITAL | Age: 63
End: 2023-05-10
Payer: COMMERCIAL

## 2023-05-10 ENCOUNTER — OFFICE VISIT (OUTPATIENT)
Dept: MAMMOGRAPHY | Facility: CLINIC | Age: 63
End: 2023-05-10
Payer: COMMERCIAL

## 2023-05-10 VITALS
SYSTOLIC BLOOD PRESSURE: 157 MMHG | OXYGEN SATURATION: 96 % | DIASTOLIC BLOOD PRESSURE: 77 MMHG | HEART RATE: 105 BPM | BODY MASS INDEX: 31.66 KG/M2 | HEIGHT: 66 IN | WEIGHT: 197 LBS

## 2023-05-10 DIAGNOSIS — Z91.89 AT HIGH RISK FOR BREAST CANCER: Primary | ICD-10-CM

## 2023-05-10 DIAGNOSIS — Z13.79 GENETIC TESTING: Primary | ICD-10-CM

## 2023-05-10 DIAGNOSIS — Z15.09 MONOALLELIC MUTATION OF PALB2 GENE: ICD-10-CM

## 2023-05-10 DIAGNOSIS — Z15.01 MONOALLELIC MUTATION OF PALB2 GENE: ICD-10-CM

## 2023-05-10 DIAGNOSIS — Z80.3 FAMILY HISTORY OF BREAST CANCER: ICD-10-CM

## 2023-05-10 DIAGNOSIS — Z80.0 FAMILY HISTORY OF PANCREATIC CANCER: ICD-10-CM

## 2023-05-10 DIAGNOSIS — Z15.89 MONOALLELIC MUTATION OF PALB2 GENE: ICD-10-CM

## 2023-05-10 PROCEDURE — 96040: CPT | Performed by: GENETIC COUNSELOR, MS

## 2023-05-10 NOTE — LETTER
May 10, 2023     Margarita Newby MD  [address]  [place] [address]    Patient: Mari Whitman   YOB: 1960   Date of Visit: 5/10/2023       Dear Dr. Odin MD:    Thank you for referring Mari Whitman to me for evaluation. Below are the relevant portions of my assessment and plan of care.    Assessment:  1. At high risk for breast cancer    The patient is aware that high risk breast cancer patients are identified by a genetic mutation, strong family history for breast cancer, LCIS, atypical hyperplasia, or history of radiation to the chest wall under the age of 30.  In this case she has a genetic mutation.  Surprisingly she does not have a strong family history for breast cancer but a lot of pancreatic cancer.  She does have an appointment with a gastroenterologist in the very near future and will discuss pancreatic screening in the future.  Her estimated lifetime risk of breast cancer with this mutation is 40 to 60%.  It is generally recommended that she alternate mammograms and MRIs on a 6-month basis.  She would also benefit from clinical breast examination every 6 months.  We also talked about the possibility of chemoprevention.  The Danica model would suggest her 5-year risk at 2.0%.  The patient is not interested in that at this point in time but we can always revisit that.    She is also aware of the benefits of exercise, maintaining an ideal body weight, and limiting alcohol intake.  She was interested in talking with the nutritionist.      Plan:  1.  We will obtain genetics counseling today.  2.  I will make a referral to the high risk nutrition clinic.  3.  We would like to obtain an MRI in September which will be 6 months after her recent mammograms.  I would like to see her back in the office in approximately 1 year.   4.  I will alternate 6-month clinical breast exam with Dr. Newby.  If you have questions, please do not hesitate to call me. I look forward to following Mari koenig  with you.         Sincerely,        Malachi Prieto MD        CC: MD Ida Negron, Malachi AIKEN MD  05/10/23 9553  Signed  Chief Complaint: Mari Whitman is a 62 y.o.. female here today for Consult        History of Present Illness:  Patient presents with management of breast cancer risk.   She is a nice 62-year-old white female who over the last number of years has had a lot of bouts of pancreatitis for what turned out to be secondary to pancreas divisum.  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  This gene would significantly increase her risk of breast cancer.  Her family history is significant for a maternal aunt who had breast cancer.  She has 3 maternal uncles with pancreatic cancer a maternal grandmother with colon cancer and a father with melanoma.    Her most recent imaging was 2023.  I personally reviewed those imaging studies.  She does have scattered fibroglandular densities.  I do not see any concerning microcalcifications, masses, or areas of architectural distortion.    The patient does not have any complaints referable to her breast today.    Review of Systems:  Review of Systems   Skin:        The patient denies any noticeable changes to the skin of the breast   All other systems reviewed and are negative.     I have reviewed the ROS as documented by the MA/LPN/RN Malachi Prieto MD      Past Medical and Surgical History:  Breast Biopsy History:  Patient has not had a breast biopsy in the past.  Breast Cancer HIstory:  Patient does not have a past medical history of breast cancer.  Breast Operations, and year:  0  Social History     Tobacco Use   Smoking Status Former   • Types: Cigarettes   • Quit date:    • Years since quittin.3   Smokeless Tobacco Current     Obstetric History:  Patient is postmenopausal due to removal of her uterus and both ovaries in the  following year:1997   Number of pregnancies:2  Number of live births: 2  Number of abortions or miscarriages: 0  Age of delivery of first child: 32  Patient breast fed, for the following lenth of time:6 mons  Length of time taking birth control pills:pills unknown  Patient took hormone replacement during the following dates:    Past Surgical History:   Procedure Laterality Date   • ANAL SPHINCTEROPLASTY     • BACK SURGERY     • CHOLECYSTECTOMY     • COLONOSCOPY     • ENDOSCOPY N/A 5/18/2020    Procedure: ESOPHAGOGASTRODUODENOSCOPY with biopsy x 2 areas;  Surgeon: Brad Bishop MD;  Location: [facility] ENDOSCOPY;  Service: Gastroenterology;  Laterality: N/A;  Post operative diagnosis: gastritis   • ERCP     • HYSTERECTOMY      2000   • NASAL SEPTUM SURGERY         Past Medical History:   Diagnosis Date   • Borderline diabetes    • Cancer     cervix   • GERD (gastroesophageal reflux disease)    • Idiopathic thrombocytopenic purpura (ITP)    • Pancreatic divisum    • PONV (postoperative nausea and vomiting)    • Sleep apnea        Prior Hospitalizations, other than for surgery or childbirth, and year:  None    Social History:  Patient is single.  Patient has 1 and 1 Son daughters.    Family History:  History reviewed. No pertinent family history.    Vital Signs:  Vitals:    05/10/23 1105   BP: 157/77   Pulse: 105   SpO2: 96%       Medications:    Current Outpatient Prescriptions:     Current Outpatient Medications:   •  Acetylcysteine (N-ACETYL-L-CYSTEINE PO), Take 1,200 mg by mouth Daily., Disp: , Rfl:   •  amphetamine-dextroamphetamine (ADDERALL) 20 MG tablet, Take 20 mg by mouth 2 (Two) Times a Day., Disp: , Rfl:   •  Ascorbic Acid (C-500/CLARITZA HIPS PO), Take 1 capsule by mouth Daily., Disp: , Rfl:   •  Biotin 90539 MCG tablet, Take  by mouth., Disp: , Rfl:   •  BIOTIN PO, Take 1 capsule by mouth Daily., Disp: , Rfl:   •  Ca Phosphate-Cholecalciferol (CALTRATE GUMMY BITES PO), Take 2 tablets by mouth every night at  bedtime., Disp: , Rfl:   •  clidinium-chlordiazePOXIDE (LIBRAX) 5-2.5 MG per capsule, LIBRAX 5-2.5 MG CAPS, Disp: , Rfl:   •  clonazePAM (KlonoPIN) 0.5 MG tablet, Take 0.5 mg by mouth 2 (Two) Times a Day As Needed for Seizures. May take dos, Disp: , Rfl:   •  dicyclomine (BENTYL) 10 MG capsule, Take 10 mg by mouth 4 (Four) Times a Day Before Meals & at Bedtime., Disp: , Rfl:   •  divalproex (DEPAKOTE) 250 MG 24 hr tablet, TAKE THREE (3) TABLETS BY MOUTH EVERY NIGHT AT BEDTIME, Disp: , Rfl:   •  escitalopram (LEXAPRO) 20 MG tablet, Take 20 mg by mouth Daily. Take dos, Disp: , Rfl:   •  estradiol (ESTRACE) 1 MG tablet, Take 1 mg by mouth Daily., Disp: , Rfl:   •  estrogens, conjugated, (PREMARIN) 0.3 MG tablet, , Disp: , Rfl:   •  fenofibrate 160 MG tablet, Take 160 mg by mouth Daily., Disp: , Rfl:   •  glucosamine-chondroitin 500-400 MG capsule capsule, Take 2 capsules by mouth every night at bedtime., Disp: , Rfl:   •  glycerin (ADULT) 2 g suppository rectal suppository, Insert 1 suppository into the rectum., Disp: , Rfl:   •  hydrocortisone 0.5 % cream, Apply 1 application topically to the appropriate area as directed 2 (Two) Times a Day., Disp: , Rfl:   •  Lactobacillus (PROBIOTIC ACIDOPHILUS PO), , Disp: , Rfl:   •  levothyroxine (SYNTHROID, LEVOTHROID) 100 MCG tablet, Take 100 mcg by mouth Daily. Take dos, Disp: , Rfl:   •  magnesium gluconate (MAGONATE) 500 MG tablet, Take 27 mg by mouth 2 (Two) Times a Day., Disp: , Rfl:   •  Melatonin 10 MG tablet, Take  by mouth., Disp: , Rfl:   •  methylcellulose oral powder, Take 5 g by mouth 4 (Four) Times a Day., Disp: , Rfl:   •  Multiple Vitamins-Minerals (CENTRUM MULTIGUMMIES PO), Take 2 tablets by mouth every night at bedtime., Disp: , Rfl:   •  Multiple Vitamins-Minerals (ECHINACEA ACZ PO), Take 25 mg by mouth Daily., Disp: , Rfl:   •  Pancrelipase, Lip-Prot-Amyl, (CREON PO), Take 3,600 mg by mouth Daily., Disp: , Rfl:   •  pantoprazole (PROTONIX) 40 MG EC tablet,  Take 40 mg by mouth Daily. Ok dos, Disp: , Rfl:   •  Peppermint Oil (IBGARD PO), Take 3 capsules by mouth Daily., Disp: , Rfl:   •  pramipexole (MIRAPEX) 0.25 MG tablet, Take 0.25 mg by mouth 2 (Two) Times a Day., Disp: , Rfl:   •  progesterone (PROMETRIUM) 100 MG capsule, Take 100 mg by mouth Daily., Disp: , Rfl:   •  rosuvastatin (CRESTOR) 10 MG tablet, , Disp: , Rfl:   •  saccharomyces boulardii (FLORASTOR) 250 MG capsule, Take 250 mg by mouth 3 (Three) Times a Day., Disp: , Rfl:   •  Suvorexant 20 MG tablet, Take 1 tablet by mouth At Night As Needed., Disp: , Rfl:   •  TURMERIC PO, Take 270 mg by mouth Daily., Disp: , Rfl:   •  valACYclovir (VALTREX) 500 MG tablet, Take 1 tablet by mouth Daily., Disp: , Rfl:   •  vitamin E 1000 UNIT capsule, Take 1,000 Units by mouth Daily., Disp: , Rfl:     Physical Examination:  General Appearance:   Patient is in no distress.  She is well kept and has a BMI of 31.8.  Psychiatric:  Patient with appropriate mood and affect. Alert and oriented to self, time, and place.    Breast, RIGHT:  medium sized, asymmetric with the contralateral side (smaller).  Breast skin is without erythema, edema, rashes.  There are no visible abnormalities upon inspection during the arm-raising maneuver or with hands on hips in the sitting position. There is no nipple retraction, discharge or nipple/areolar skin changes.There are no masses palpable in the sitting or supine positions.    Breast, LEFT:  medium sized, asymmetric with the contralateral side (larger,).  Breast skin is without erythema, edema, rashes.  There are no visible abnormalities upon inspection during the arm-raising maneuver or with hands on hips in the sitting position. There is no nipple retraction, discharge or nipple/areolar skin changes.There are no masses palpable in the sitting or supine positions.    Lymphatic:  There is no axillary, cervical, infraclavicular, or supraclavicular adenopathy  bilaterally.    Gastrointestinal:  Abdomen is soft, nondistended, and nontender.  There was no obvious hepatosplenomegaly or abdominal mass.  There are  scars from previous surgery.    Musculoskeletal:  Good strength in all 4 extremities.   There is good range of motion in both shoulders.        Assessment:  1. At high risk for breast cancer    The patient is aware that high risk breast cancer patients are identified by a genetic mutation, strong family history for breast cancer, LCIS, atypical hyperplasia, or history of radiation to the chest wall under the age of 30.  In this case she has a genetic mutation.  Surprisingly she does not have a strong family history for breast cancer but a lot of pancreatic cancer.  She does have an appointment with a gastroenterologist in the very near future and will discuss pancreatic screening in the future.  Her estimated lifetime risk of breast cancer with this mutation is 40 to 60%.  It is generally recommended that she alternate mammograms and MRIs on a 6-month basis.  She would also benefit from clinical breast examination every 6 months.  We also talked about the possibility of chemoprevention.  The Danica model would suggest her 5-year risk at 2.0%.  The patient is not interested in that at this point in time but we can always revisit that.    She is also aware of the benefits of exercise, maintaining an ideal body weight, and limiting alcohol intake.  She was interested in talking with the nutritionist.      Plan:  1.  We will obtain genetics counseling today.  2.  I will make a referral to the high risk nutrition clinic.  3.  We would like to obtain an MRI in September which will be 6 months after her recent mammograms.  I would like to see her back in the office in approximately 1 year.   4.  I will alternate 6-month clinical breast exam with Dr. Newby.    CPT coding:    Next Appointment:  No follow-ups on file.            EMR Dragon/transcription disclaimer:    Much of  this encounter note is an electronic transcription/translocation of spoken language to printed text.  The electronic translation of spoken language may permit erroneous, or at times, nonsensical words or phrases to be inadvertently transcribed.  Although I have reviewed the note from such areas, some may still exist.

## 2023-05-10 NOTE — PROGRESS NOTES
Mari Whitman, a 62-year-old female, was seen for genetic counseling to discuss prior genetic results. Ms. Whitman has a history of cervical cancer diagnosed at age 37. She had a total hysterectomy due to her cancer diagnosis and put on HRT. She is currently lowering her doses of this. Ms. Whitman’s most recent mammogram was in February/March 2023 and was normal. She has been referred to have a breast MRI and then will be alternating every 6 months between a mammogram and breast MRI. She had a normal colonoscopy in May 2022 and was told to repeat that in three years. Ms. Whitman had genetic testing performed with [facility] about 8 years ago due to her family history of cancer. Genetic testing identified a pathogenic mutation in the PALB2 gene. Ms. Whitman was interested in discussing the information related to these findings.    PERTINENT FAMILY HISTORY: (See attached pedigree)   Mat Grandmother: Colon cancer  Mat Uncle:  Pancreatic cancer  Mat Aunt 1:  Breast cancer  Mat Aunt 2:  Lung cancer  Mat Aunt 3:  Non-Hodgkins Lymphoma  Mat Grandfather: Lung cancer  Father:   Melanoma  Pat Grandfather: Bone cancer    Records were not able to be obtained to confirm these cancer diagnoses.     GENETIC COUNSELING (30 minutes): We reviewed the family history information in detail. Cases of breast cancer follow three general patterns: sporadic, familial, and hereditary. While most cancer is sporadic, some cases appear to occur in family clusters. These cases are said to be familial and account for 10-20% of breast cancer cases. Familial cases may be due to a combination of shared genes and environmental factors among family members. In even fewer cases (5-10%), the risk for cancer is inherited, and the genes responsible for the increased cancer risk are known.       Family histories typical of hereditary cancer syndromes usually include multiple first- and second-degree relatives diagnosed with cancer types that define a  syndrome. These cases tend to be diagnosed at younger-than-expected ages and can be bilateral or multifocal. The cancer in these families follows an autosomal dominant inheritance pattern, which indicates the likely presence of a mutation in a cancer susceptibility gene. Children and siblings of an individual believed to carry this mutation have a 50% chance of inheriting that mutation, thereby inheriting the increased risk to develop cancer. These mutations can be passed down from the maternal or the paternal lineage.     Hereditary breast cancer accounts for 5-10% of all cases of breast cancer. A significant proportion of hereditary breast cancer can be attributed to mutations in the BRCA1 and BRCA2 genes. Mutations in these genes confer an increased risk for breast cancer, ovarian cancer, male breast cancer, prostate cancer and pancreatic cancer. There are other genes that are known to be associated with an increased risk for breast cancer and other cancers. In order to get as much information as possible regarding Ms. Whitman personal risks and potential risks for her family, testing was pursued through a multigene panel that would look at several other genes known to increase the risk for breast cancer and other cancers.     GENETIC TESTING:  The risks, benefits and limitations of genetic testing and implications for clinical management following testing were reviewed. DNA test results can influence decisions regarding screening and prevention. Genetic testing can have significant psychological implications for both individuals and families.     We reviewed the possible results of genetic testing and the implications of both positive and negative findings. We discussed the possibility of identifying a variant of uncertain significance (VUS) via testing as well.     TEST RESULTS:  Ms. Whitman’s genetic testing identified a pathogenic mutation in the PALB2 gene. Ms. Whitman’s siblings and children each have a 50%  chance of having this same mutation.     Until each at-risk family member has been proven not to carry this PALB2 mutation, they could be offered increased surveillance. We would be happy to see family members who live in the area in our clinic to further discuss this information and testing options. Relatives can call our office at [phone] for assistance in scheduling an appointment; however, a physician referral to our office will prompt our coordinator to contact patients to schedule an appointment. For family members who live elsewhere, there are genetic counselors at most [facility]. They can find a genetic counselor by visiting the National Society of Genetic Counselors website at www.nsgc.org or they can call our office and we would be happy to give them the contact information of the closest genetic counselor. Testing is available to individuals once they are 18 years of age. Relatives would need a copy of Ms. Whitman’s genetic test result to ensure they were being tested for the correct mutation.    CANCER RISK:  Mutations in PALB2 have been found to increase the risk of female breast cancer resulting in a lifetime risk of approximately 41-60%. NCCN guidelines quote the ovarian cancer risk associated with PALB2 to be 3-5%. There have been reports of increased risks for pancreatic cancer and male breast cancer. The lifetime risk for pancreatic cancer is estimated to be 5-10% (2023 NCCN).     CANCER SCREENING:  Options available to individuals with a PALB2 mutation were discussed, including increased surveillance, chemoprevention and prophylactic surgery.     Increased breast cancer surveillance is warranted in women who have a PALB2 mutation. Increased surveillance, based on NCCN guidelines, would consist of semi-annual clinical breast exam and monthly self-breast exam starting at age 18 and annual mammogram and breast MRI starting at age 30 or earlier based on family history. For women who  have not had breast cancer and have a PALB2 mutation, chemoprevention is another option that is considered.  Studies have shown that Tamoxifen and Raloxifene can cut the risk of estrogen receptor positive breast cancer by 50% when taken by high-risk women. There are risks associated with these medications; therefore, the risks versus benefits must be considered prior to deciding to take chemopreventative medications. Per NCCN guidelines, bilateral risk reducing mastectomy for carriers of a PALB2 mutation may be considered based on family history and this reduces breast cancer risk by approximately 90%.      Per the most recent NCCN recommendations (updated 9/7/2022), a RRSO (risk reducing salpingo-oophorectomy) can be considered over the age of 45 for individuals with a PALB2 mutation for ovarian cancer risk reduction. Ms. Whitman had a total hysterectomy at 37 due to her history of cervical cancer.       Although no standardized screening tests have been proven to be effective in early pancreatic cancer detection, there are some options considered for individuals with a family history of pancreatic cancer and an identified PALB2 mutation. Some academic centers are offer screening with endoscopic ultrasound, high-resolution pancreatic protocol CT, or MRI, starting at age 50 or 10 years younger than the earliest diagnosis of pancreatic cancer in the family. Since these screening methods are not standard of care by NCCN guidelines, consideration of referral to a clinical research screening program is appropriate. Ms. Whitman does have a known family history of pancreatic cancer. We discussed the Pancreatic Cancer Screening Program at the [facility], and she would like to be seen in that clinic.      PLAN: Genetic counseling remains available to Ms. Whitman and her family. She plans to continue being seen in the [facility], and we will contact the Pancreatic Cancer Screening Program  at the [facility] about seeing her. She is welcome to contact us at [phone] with any questions she may have.    Maricruz Marlow MS, [facility] [place], [place], [facility]  Licensed Certified Genetic Counselor    Cc: Mari Prieto MD

## 2023-05-10 NOTE — PROGRESS NOTES
Chief Complaint: Mari Whitman is a 62 y.o.. female here today for Consult        History of Present Illness:  Patient presents with management of breast cancer risk.   She is a nice 62-year-old white female who over the last number of years has had a lot of bouts of pancreatitis for what turned out to be secondary to pancreas divisum.  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  This gene would significantly increase her risk of breast cancer.  Her family history is significant for a maternal aunt who had breast cancer.  She has 3 maternal uncles with pancreatic cancer a maternal grandmother with colon cancer and a father with melanoma.    Her most recent imaging was 2023.  I personally reviewed those imaging studies.  She does have scattered fibroglandular densities.  I do not see any concerning microcalcifications, masses, or areas of architectural distortion.    The patient does not have any complaints referable to her breast today.    Review of Systems:  Review of Systems   Skin:        The patient denies any noticeable changes to the skin of the breast   All other systems reviewed and are negative.     I have reviewed the ROS as documented by the MA/LPN/RN Malachi Prieto MD      Past Medical and Surgical History:  Breast Biopsy History:  Patient has not had a breast biopsy in the past.  Breast Cancer HIstory:  Patient does not have a past medical history of breast cancer.  Breast Operations, and year:  0  Social History     Tobacco Use   Smoking Status Former   • Types: Cigarettes   • Quit date:    • Years since quittin.3   Smokeless Tobacco Current     Obstetric History:  Patient is postmenopausal due to removal of her uterus and both ovaries in the following year:   Number of pregnancies:2  Number of live births: 2  Number of abortions or miscarriages: 0  Age of delivery of first child:  32  Patient breast fed, for the following lenth of time:6 mons  Length of time taking birth control pills:pills unknown  Patient took hormone replacement during the following dates:    Past Surgical History:   Procedure Laterality Date   • ANAL SPHINCTEROPLASTY     • BACK SURGERY     • CHOLECYSTECTOMY     • COLONOSCOPY     • ENDOSCOPY N/A 5/18/2020    Procedure: ESOPHAGOGASTRODUODENOSCOPY with biopsy x 2 areas;  Surgeon: Brad Bishop MD;  Location: [facility] ENDOSCOPY;  Service: Gastroenterology;  Laterality: N/A;  Post operative diagnosis: gastritis   • ERCP     • HYSTERECTOMY      2000   • NASAL SEPTUM SURGERY         Past Medical History:   Diagnosis Date   • Borderline diabetes    • Cancer     cervix   • GERD (gastroesophageal reflux disease)    • Idiopathic thrombocytopenic purpura (ITP)    • Pancreatic divisum    • PONV (postoperative nausea and vomiting)    • Sleep apnea        Prior Hospitalizations, other than for surgery or childbirth, and year:  None    Social History:  Patient is single.  Patient has 1 and 1 Son daughters.    Family History:  History reviewed. No pertinent family history.    Vital Signs:  Vitals:    05/10/23 1105   BP: 157/77   Pulse: 105   SpO2: 96%       Medications:    Current Outpatient Prescriptions:     Current Outpatient Medications:   •  Acetylcysteine (N-ACETYL-L-CYSTEINE PO), Take 1,200 mg by mouth Daily., Disp: , Rfl:   •  amphetamine-dextroamphetamine (ADDERALL) 20 MG tablet, Take 20 mg by mouth 2 (Two) Times a Day., Disp: , Rfl:   •  Ascorbic Acid (C-500/CLARITZA HIPS PO), Take 1 capsule by mouth Daily., Disp: , Rfl:   •  Biotin 55116 MCG tablet, Take  by mouth., Disp: , Rfl:   •  BIOTIN PO, Take 1 capsule by mouth Daily., Disp: , Rfl:   •  Ca Phosphate-Cholecalciferol (CALTRATE GUMMY BITES PO), Take 2 tablets by mouth every night at bedtime., Disp: , Rfl:   •  clidinium-chlordiazePOXIDE (LIBRAX) 5-2.5 MG per capsule, LIBRAX 5-2.5 MG CAPS, Disp: , Rfl:   •  clonazePAM  (KlonoPIN) 0.5 MG tablet, Take 0.5 mg by mouth 2 (Two) Times a Day As Needed for Seizures. May take dos, Disp: , Rfl:   •  dicyclomine (BENTYL) 10 MG capsule, Take 10 mg by mouth 4 (Four) Times a Day Before Meals & at Bedtime., Disp: , Rfl:   •  divalproex (DEPAKOTE) 250 MG 24 hr tablet, TAKE THREE (3) TABLETS BY MOUTH EVERY NIGHT AT BEDTIME, Disp: , Rfl:   •  escitalopram (LEXAPRO) 20 MG tablet, Take 20 mg by mouth Daily. Take dos, Disp: , Rfl:   •  estradiol (ESTRACE) 1 MG tablet, Take 1 mg by mouth Daily., Disp: , Rfl:   •  estrogens, conjugated, (PREMARIN) 0.3 MG tablet, , Disp: , Rfl:   •  fenofibrate 160 MG tablet, Take 160 mg by mouth Daily., Disp: , Rfl:   •  glucosamine-chondroitin 500-400 MG capsule capsule, Take 2 capsules by mouth every night at bedtime., Disp: , Rfl:   •  glycerin (ADULT) 2 g suppository rectal suppository, Insert 1 suppository into the rectum., Disp: , Rfl:   •  hydrocortisone 0.5 % cream, Apply 1 application topically to the appropriate area as directed 2 (Two) Times a Day., Disp: , Rfl:   •  Lactobacillus (PROBIOTIC ACIDOPHILUS PO), , Disp: , Rfl:   •  levothyroxine (SYNTHROID, LEVOTHROID) 100 MCG tablet, Take 100 mcg by mouth Daily. Take dos, Disp: , Rfl:   •  magnesium gluconate (MAGONATE) 500 MG tablet, Take 27 mg by mouth 2 (Two) Times a Day., Disp: , Rfl:   •  Melatonin 10 MG tablet, Take  by mouth., Disp: , Rfl:   •  methylcellulose oral powder, Take 5 g by mouth 4 (Four) Times a Day., Disp: , Rfl:   •  Multiple Vitamins-Minerals (CENTRUM MULTIGUMMIES PO), Take 2 tablets by mouth every night at bedtime., Disp: , Rfl:   •  Multiple Vitamins-Minerals (ECHINACEA ACZ PO), Take 25 mg by mouth Daily., Disp: , Rfl:   •  Pancrelipase, Lip-Prot-Amyl, (CREON PO), Take 3,600 mg by mouth Daily., Disp: , Rfl:   •  pantoprazole (PROTONIX) 40 MG EC tablet, Take 40 mg by mouth Daily. Ok dos, Disp: , Rfl:   •  Peppermint Oil (IBGARD PO), Take 3 capsules by mouth Daily., Disp: , Rfl:   •   pramipexole (MIRAPEX) 0.25 MG tablet, Take 0.25 mg by mouth 2 (Two) Times a Day., Disp: , Rfl:   •  progesterone (PROMETRIUM) 100 MG capsule, Take 100 mg by mouth Daily., Disp: , Rfl:   •  rosuvastatin (CRESTOR) 10 MG tablet, , Disp: , Rfl:   •  saccharomyces boulardii (FLORASTOR) 250 MG capsule, Take 250 mg by mouth 3 (Three) Times a Day., Disp: , Rfl:   •  Suvorexant 20 MG tablet, Take 1 tablet by mouth At Night As Needed., Disp: , Rfl:   •  TURMERIC PO, Take 270 mg by mouth Daily., Disp: , Rfl:   •  valACYclovir (VALTREX) 500 MG tablet, Take 1 tablet by mouth Daily., Disp: , Rfl:   •  vitamin E 1000 UNIT capsule, Take 1,000 Units by mouth Daily., Disp: , Rfl:     Physical Examination:  General Appearance:   Patient is in no distress.  She is well kept and has a BMI of 31.8.  Psychiatric:  Patient with appropriate mood and affect. Alert and oriented to self, time, and place.    Breast, RIGHT:  medium sized, asymmetric with the contralateral side (smaller).  Breast skin is without erythema, edema, rashes.  There are no visible abnormalities upon inspection during the arm-raising maneuver or with hands on hips in the sitting position. There is no nipple retraction, discharge or nipple/areolar skin changes.There are no masses palpable in the sitting or supine positions.    Breast, LEFT:  medium sized, asymmetric with the contralateral side (larger,).  Breast skin is without erythema, edema, rashes.  There are no visible abnormalities upon inspection during the arm-raising maneuver or with hands on hips in the sitting position. There is no nipple retraction, discharge or nipple/areolar skin changes.There are no masses palpable in the sitting or supine positions.    Lymphatic:  There is no axillary, cervical, infraclavicular, or supraclavicular adenopathy bilaterally.    Gastrointestinal:  Abdomen is soft, nondistended, and nontender.  There was no obvious hepatosplenomegaly or abdominal mass.  There are  scars from  previous surgery.    Musculoskeletal:  Good strength in all 4 extremities.   There is good range of motion in both shoulders.        Assessment:  1. At high risk for breast cancer    The patient is aware that high risk breast cancer patients are identified by a genetic mutation, strong family history for breast cancer, LCIS, atypical hyperplasia, or history of radiation to the chest wall under the age of 30.  In this case she has a genetic mutation.  Surprisingly she does not have a strong family history for breast cancer but a lot of pancreatic cancer.  She does have an appointment with a gastroenterologist in the very near future and will discuss pancreatic screening in the future.  Her estimated lifetime risk of breast cancer with this mutation is 40 to 60%.  It is generally recommended that she alternate mammograms and MRIs on a 6-month basis.  She would also benefit from clinical breast examination every 6 months.  We also talked about the possibility of chemoprevention.  The Danica model would suggest her 5-year risk at 2.0%.  The patient is not interested in that at this point in time but we can always revisit that.    She is also aware of the benefits of exercise, maintaining an ideal body weight, and limiting alcohol intake.  She was interested in talking with the nutritionist.      Plan:  1.  We will obtain genetics counseling today.  2.  I will make a referral to the high risk nutrition clinic.  3.  We would like to obtain an MRI in September which will be 6 months after her recent mammograms.  I would like to see her back in the office in approximately 1 year.   4.  I will alternate 6-month clinical breast exam with Dr. Newby.    CPT coding:    Next Appointment:  No follow-ups on file.            EMR Dragon/transcription disclaimer:    Much of this encounter note is an electronic transcription/translocation of spoken language to printed text.  The electronic translation of spoken language may permit  erroneous, or at times, nonsensical words or phrases to be inadvertently transcribed.  Although I have reviewed the note from such areas, some may still exist.

## 2023-05-17 ENCOUNTER — TELEPHONE (OUTPATIENT)
Dept: MAMMOGRAPHY | Facility: CLINIC | Age: 63
End: 2023-05-17
Payer: COMMERCIAL

## 2023-05-17 NOTE — TELEPHONE ENCOUNTER
I was able to speak with her today.  She and Dr. Newby already have a plan to wean off of the hormone replacement therapy.  Obviously, we would wait on any chemoprevention until that has occurred.

## 2023-05-30 ENCOUNTER — TELEPHONE (OUTPATIENT)
Dept: GENETICS | Facility: HOSPITAL | Age: 63
End: 2023-05-30

## 2023-05-30 NOTE — TELEPHONE ENCOUNTER
Attempted to contact MsBeck J Carlos regarding her referral to the [facility] pancreatic cancer screening program. She did not answer. Left VM asking her to return my call.

## 2023-06-07 ENCOUNTER — DOCUMENTATION (OUTPATIENT)
Dept: GENETICS | Facility: HOSPITAL | Age: 63
End: 2023-06-07
Payer: COMMERCIAL

## 2023-06-07 NOTE — PROGRESS NOTES
Mari Whitman, a 63-year-old female, was seen for genetic counseling to discuss prior genetic results. Ms. Whitman has a history of cervical cancer diagnosed at age 37. She had a total hysterectomy due to her cancer diagnosis and put on HRT. She is currently lowering her doses of this. Ms. Whitman's most recent mammogram was in February/March 2023 and was normal. She has been referred to have a breast MRI and then will be alternating every 6 months between a mammogram and breast MRI. She had a normal colonoscopy in May 2022 and was told to repeat that in three years. Ms. Whitman had genetic testing performed with [employer] about 8 years ago due to her family history of cancer. Genetic testing was negative for known deleterious/pathogenic mutations in the genes on this panel. 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 VUSs are differences in DNA that may or may not affect the function of the gene. VUSs are frequently reported through multigene panel testing, given the number of genes being evaluated and the presence of genetic variation in the population. The majority (estimated to be >90%) of VUSs are eventually reclassified as benign gene variation. It is not recommended that any unaffected relatives be tested for a VUS since this is not a clinically actionable finding. These results were discussed with Ms. Whitman on 5/31/23 after we received a copy of the test results that we did not have at her initial appointment.    PERTINENT FAMILY HISTORY: (See attached pedigree)   Mat Grandmother: Colon cancer  Mat Uncle:  Pancreatic cancer  Mat Aunt 1:  Breast cancer  Mat Aunt 2:  Lung cancer  Mat Aunt 3:  Non-Hodgkins Lymphoma  Mat Grandfather: Lung cancer  Father:   Melanoma  Pat Grandfather: Bone cancer    Records were not able to be obtained to confirm these cancer diagnoses.     RISK ASSESSMENT:  At this time, Ms. Whitman's  management should be guided by a family history-based risk assessment. Tyrer-Cuzick, version 8 is able to take into account personal factors (age at menarche, age at first live birth, breast density, etc) and family history when calculating risk for breast cancer. Computer modeling estimates that Ms. Pablos lifetime personal risk for developing breast cancer is up to 5.6% (Chris-Radhack, v8), compared to the average 61-year-old female's risk of 7%. In general, a lifetime risk above 20% is considered to be “high risk” where increased screening is warranted; Ms. Bland risk does not fall into that category. This risk assessment is based on the family history information provided at the time of the appointment and could change in the future should new information be obtained.    GENETIC COUNSELING (30 minutes): We reviewed the family history information in detail. Cases of breast cancer follow three general patterns: sporadic, familial, and hereditary. While most cancer is sporadic, some cases appear to occur in family clusters. These cases are said to be familial and account for 10-20% of breast cancer cases. Familial cases may be due to a combination of shared genes and environmental factors among family members. In even fewer cases (5-10%), the risk for cancer is inherited, and the genes responsible for the increased cancer risk are known.       Family histories typical of hereditary cancer syndromes usually include multiple first- and second-degree relatives diagnosed with cancer types that define a syndrome. These cases tend to be diagnosed at younger-than-expected ages and can be bilateral or multifocal. The cancer in these families follows an autosomal dominant inheritance pattern, which indicates the likely presence of a mutation in a cancer susceptibility gene. Children and siblings of an individual believed to carry this mutation have a 50% chance of inheriting that mutation, thereby inheriting the  increased risk to develop cancer. These mutations can be passed down from the maternal or the paternal lineage.     Hereditary breast cancer accounts for 5-10% of all cases of breast cancer. A significant proportion of hereditary breast cancer can be attributed to mutations in the BRCA1 and BRCA2 genes. Mutations in these genes confer an increased risk for breast cancer, ovarian cancer, male breast cancer, prostate cancer and pancreatic cancer. There are other genes that are known to be associated with an increased risk for breast cancer and other cancers. In order to get as much information as possible regarding Ms. Whitman personal risks and potential risks for her family, testing was pursued through a multigene panel that would look at several other genes known to increase the risk for breast cancer and other cancers.     GENETIC TESTING:  The risks, benefits and limitations of genetic testing and implications for clinical management following testing were reviewed. DNA test results can influence decisions regarding screening and prevention. Genetic testing can have significant psychological implications for both individuals and families.     We reviewed the possible results of genetic testing and the implications of both positive and negative findings. We discussed the possibility of identifying a variant of uncertain significance (VUS) via testing as well.     TEST RESULTS:  Genetic testing was negative for known deleterious mutations by sequencing and rearrangement testing for the genes on the ecobeesk panel with [employer]. A variant of uncertain significance (VUS) was identified in the PALB2 gene. VUSs are not clinically actionable findings, and therefore this result does not impact management in any way. The majority of VUSs are ultimately reclassified to benign variation. The identification of a VUS is common in multigene panel testing, given the number of genes being evaluated and the presence of genetic  variation in the population. If this VUS is ever reclassified, a new report will be issued by the laboratory and released directly to the ordering physician. This assessment is based on the information provided at the time of the consultation.    CANCER PREVENTION:  Options available to individuals with an elevated lifetime risk for breast and/or ovarian cancer were briefly discussed. Based on computer modeling, Ms. Whitman's lifetime risk for breast cancer would not be considered “high risk” (>20%) though this assessment did not take into account her dense breast tissue. Per NCCN guidelines, it is appropriate for her to follow general population screening guidelines for her breast cancer risk including annual clinical breast exam and annual mammography. These assessments are based on the information provided at the time of consultation.    PLAN: Genetic counseling remains available to Ms. Whitman. I encouraged her to check with our office yearly regarding updates to her testing results. She still would like to be seen in the [facility]'s pancreatic cancer screening program due to her family history of pancreatic cancer and her personal diagnosis pancreatic divisum. We will contact the [facility] pancreatic screening program about seeing her. She is welcome to contact us with any questions or concerns at [phone].    Maricruz Marlow MS, [facility] [place]  Licensed Certified Genetic Counselor    Cc: Mari Prieto MD

## 2023-06-12 ENCOUNTER — OFFICE (AMBULATORY)
Dept: URBAN - METROPOLITAN AREA CLINIC 64 | Facility: CLINIC | Age: 63
End: 2023-06-12

## 2023-06-12 VITALS
DIASTOLIC BLOOD PRESSURE: 77 MMHG | HEIGHT: 66 IN | WEIGHT: 194 LBS | HEART RATE: 86 BPM | SYSTOLIC BLOOD PRESSURE: 126 MMHG

## 2023-06-12 DIAGNOSIS — K21.9 GASTRO-ESOPHAGEAL REFLUX DISEASE WITHOUT ESOPHAGITIS: ICD-10-CM

## 2023-06-12 DIAGNOSIS — K58.0 IRRITABLE BOWEL SYNDROME WITH DIARRHEA: ICD-10-CM

## 2023-06-12 DIAGNOSIS — K64.9 UNSPECIFIED HEMORRHOIDS: ICD-10-CM

## 2023-06-12 DIAGNOSIS — R13.10 DYSPHAGIA, UNSPECIFIED: ICD-10-CM

## 2023-06-12 DIAGNOSIS — K86.1 OTHER CHRONIC PANCREATITIS: ICD-10-CM

## 2023-06-12 DIAGNOSIS — R11.2 NAUSEA WITH VOMITING, UNSPECIFIED: ICD-10-CM

## 2023-06-12 PROCEDURE — 99214 OFFICE O/P EST MOD 30 MIN: CPT | Performed by: INTERNAL MEDICINE

## 2023-06-12 RX ORDER — COLESTIPOL HYDROCHLORIDE 1 G/1
1 TABLET, FILM COATED ORAL
Qty: 30 | Refills: 11 | Status: ACTIVE
Start: 2023-06-12

## 2023-06-13 NOTE — PROGRESS NOTES
Chief Complaint  Sleep Apnea    Subjective          Mari Whitman presents to [facility]  History of Present Illness  REINALDO F/U   Patient states the day she got it she was out of power for three days.      She got sinus infection, her insurance would not fill her sleep medication so she was not able to sleep so she didn't use the CPAP.     [facility] called her and told her she needs to be using it more.  They wanted her to return it and she told them no she wanted to keep this appointment.    She has been exercising more and eating right.     She was cut off her sleeping medication which resulted in insomnia so was not able to use CPAP  Has lost about 20 lbs. Recently. .       Sleep testing history:    On NPSG at [facility] , 11/15/22 patient had Mild obstructive sleep apnea syndrome with apnea-hypopnea index of 13.7 per sleep hour, minimum SpO2 of 84%    PAP download:  The patient is on CPAP therapy at 5-15 cm/H2O.  . With 7% usage for more than 4 hours with an average usage of 1 hours 53 minutes. AHI down to 7.9 .  Average pressures 9. .   The patient's hypersomnia has stayed the same       [place] Sleepiness Scale:  Sitting and reading 1 WatchingTV 3  Sitting, inactive, in a public place 1  As a passenger in a car for 1 hour w/o a break  3  Lying down to rest in the afternoon  3  Sitting and talking to someone  0  Sitting quietly after a lunch  0  In a car, while stopped for traffic or a light  0  Total 11      Review of Systems   Constitutional:  Positive for activity change and fatigue.   HENT:  Positive for congestion, postnasal drip, rhinorrhea, sinus pressure and sinus pain.    Eyes:  Positive for itching.   Respiratory:  Positive for apnea and shortness of breath.    Gastrointestinal:  Positive for constipation, diarrhea and nausea.   Endocrine: Positive for cold intolerance and heat intolerance.   Genitourinary:  Positive for urgency.   Musculoskeletal:  Positive for back pain, neck pain  "and neck stiffness.   Allergic/Immunologic: Positive for environmental allergies.   Neurological:  Positive for headaches.   Hematological:  Bruises/bleeds easily.   Psychiatric/Behavioral:  Positive for decreased concentration and sleep disturbance. The patient is nervous/anxious.        Objective   Vital Signs:   /68 (BP Location: Left arm, Patient Position: Sitting, Cuff Size: Adult)   Pulse 89   Ht 167.6 cm (66\")   Wt 86.2 kg (190 lb)   BMI 30.67 kg/m²     Physical Exam  Vitals reviewed.   Pulmonary:      Effort: Pulmonary effort is normal. No respiratory distress.   Neurological:      General: No focal deficit present.      Mental Status: She is alert.   Psychiatric:         Mood and Affect: Mood normal.      Result Review :                 Assessment and Plan    Diagnoses and all orders for this visit:    1. Obstructive sleep apnea (Primary)    2. Other insomnia    3. REINALDO (obstructive sleep apnea)  -     Polysomnography 4 or More Parameters With CPAP; Future      The patient has not been compliant.   Problems with tolerance and compliance  Will order in lab titration study.      Follow Up   Return in about 3 months (around 9/14/2023).    Patient was given instructions and counseling regarding her condition or for health maintenance advice. Please see specific information pulled into the AVS if appropriate.       This document has been electronically signed by Joseph Seipel, MD on June 14, 2023 12:03 EDT  "

## 2023-06-14 ENCOUNTER — OFFICE VISIT (OUTPATIENT)
Dept: NEUROLOGY | Facility: CLINIC | Age: 63
End: 2023-06-14
Payer: COMMERCIAL

## 2023-06-14 VITALS
SYSTOLIC BLOOD PRESSURE: 105 MMHG | BODY MASS INDEX: 30.53 KG/M2 | WEIGHT: 190 LBS | DIASTOLIC BLOOD PRESSURE: 68 MMHG | HEART RATE: 89 BPM | HEIGHT: 66 IN

## 2023-06-14 DIAGNOSIS — G47.09 OTHER INSOMNIA: ICD-10-CM

## 2023-06-14 DIAGNOSIS — G47.33 OBSTRUCTIVE SLEEP APNEA: Primary | ICD-10-CM

## 2023-06-14 DIAGNOSIS — G47.33 OSA (OBSTRUCTIVE SLEEP APNEA): ICD-10-CM

## 2023-06-14 PROBLEM — K58.9 IRRITABLE BOWEL SYNDROME: Status: ACTIVE | Noted: 2023-06-14

## 2023-06-14 PROBLEM — K57.30 DVRTCLOS OF LG INT W/O PERFORATION OR ABSCESS W/O BLEEDING: Status: ACTIVE | Noted: 2022-06-09

## 2023-06-14 PROBLEM — A04.72 ENTEROCOLITIS DUE TO CLOSTRIDIUM DIFFICILE: Status: ACTIVE | Noted: 2023-06-14

## 2023-06-14 PROBLEM — R13.10 DYSPHAGIA: Status: ACTIVE | Noted: 2023-06-14

## 2023-06-14 PROBLEM — Z86.0100 HISTORY OF COLONIC POLYPS: Status: ACTIVE | Noted: 2023-06-14

## 2023-06-14 PROBLEM — R19.7 DIARRHEA: Status: ACTIVE | Noted: 2023-06-14

## 2023-06-14 PROBLEM — K64.9 HEMORRHOIDS WITHOUT COMPLICATION: Status: ACTIVE | Noted: 2023-06-14

## 2023-06-14 PROBLEM — K62.1 RECTAL POLYP: Status: ACTIVE | Noted: 2022-06-09

## 2023-06-14 PROBLEM — K80.20 GALLSTONES: Status: ACTIVE | Noted: 2023-06-14

## 2023-06-14 PROBLEM — R11.2 NAUSEA AND VOMITING: Status: ACTIVE | Noted: 2023-06-14

## 2023-06-14 PROBLEM — K57.92 DIVERTICULITIS: Status: ACTIVE | Noted: 2023-06-14

## 2023-06-14 PROBLEM — K30 FUNCTIONAL DYSPEPSIA: Status: ACTIVE | Noted: 2023-06-14

## 2023-06-14 PROBLEM — M19.90 ARTHRITIS: Status: ACTIVE | Noted: 2023-06-14

## 2023-06-14 PROBLEM — E78.00 PURE HYPERCHOLESTEROLEMIA: Status: ACTIVE | Noted: 2023-06-14

## 2023-06-14 PROBLEM — H16.421 CORNEAL PANNUS OF RIGHT EYE: Status: ACTIVE | Noted: 2021-08-23

## 2023-06-14 PROBLEM — K86.1 CHRONIC PANCREATITIS: Status: ACTIVE | Noted: 2023-06-14

## 2023-06-14 PROBLEM — K21.00 GASTRO-ESOPHAGEAL REFLUX DISEASE WITH ESOPHAGITIS: Status: ACTIVE | Noted: 2023-06-14

## 2023-06-14 PROBLEM — F32.9 MAJOR DEPRESSIVE DISORDER, SINGLE EPISODE, UNSPECIFIED: Status: ACTIVE | Noted: 2023-06-14

## 2023-06-14 PROBLEM — Z86.010 HISTORY OF COLONIC POLYPS: Status: ACTIVE | Noted: 2023-06-14

## 2023-06-14 PROCEDURE — 99213 OFFICE O/P EST LOW 20 MIN: CPT | Performed by: PSYCHIATRY & NEUROLOGY

## 2023-06-14 RX ORDER — ESZOPICLONE 3 MG/1
TABLET, FILM COATED ORAL
COMMUNITY
Start: 2023-04-26

## 2023-06-14 RX ORDER — PROGESTERONE 100 MG/1
CAPSULE ORAL
COMMUNITY

## 2023-06-14 RX ORDER — ONDANSETRON 4 MG/1
TABLET, ORALLY DISINTEGRATING ORAL
COMMUNITY
Start: 2023-05-09

## 2023-06-14 RX ORDER — DOXYCYCLINE 50 MG/1
TABLET ORAL
COMMUNITY

## 2023-06-14 RX ORDER — METFORMIN HYDROCHLORIDE 750 MG/1
TABLET, EXTENDED RELEASE ORAL
COMMUNITY

## 2023-06-14 RX ORDER — MONTELUKAST SODIUM 4 MG/1
TABLET, CHEWABLE ORAL
COMMUNITY
Start: 2023-06-12

## 2023-06-14 RX ORDER — ZOLPIDEM TARTRATE 10 MG/1
TABLET ORAL
COMMUNITY
Start: 2023-06-07

## 2023-08-02 ENCOUNTER — HOSPITAL ENCOUNTER (OUTPATIENT)
Dept: SLEEP MEDICINE | Facility: HOSPITAL | Age: 63
End: 2023-08-02
Payer: COMMERCIAL

## 2023-08-02 VITALS — WEIGHT: 190.04 LBS | BODY MASS INDEX: 30.54 KG/M2 | HEIGHT: 66 IN

## 2023-08-02 DIAGNOSIS — G47.33 OSA (OBSTRUCTIVE SLEEP APNEA): ICD-10-CM

## 2023-08-02 PROCEDURE — 95811 POLYSOM 6/>YRS CPAP 4/> PARM: CPT

## 2023-08-02 PROCEDURE — 95811 POLYSOM 6/>YRS CPAP 4/> PARM: CPT | Performed by: PSYCHIATRY & NEUROLOGY

## 2023-08-15 ENCOUNTER — TELEPHONE (OUTPATIENT)
Dept: NEUROLOGY | Facility: CLINIC | Age: 63
End: 2023-08-15
Payer: COMMERCIAL

## 2023-08-15 DIAGNOSIS — G47.33 OBSTRUCTIVE SLEEP APNEA: Primary | ICD-10-CM

## 2023-08-15 NOTE — TELEPHONE ENCOUNTER
----- Message from Joseph F Seipel, MD sent at 8/3/2023  7:24 PM EDT -----  Set up on auto CPAP minimum 10 maximum 12 in lab titration study completed

## 2023-08-18 ENCOUNTER — TELEPHONE (OUTPATIENT)
Dept: NEUROLOGY | Facility: CLINIC | Age: 63
End: 2023-08-18

## 2023-08-18 NOTE — TELEPHONE ENCOUNTER
Caller: MARNI    Relationship:     Best call back number: [phone]    What is the best time to reach you: ANY    Who are you requesting to speak with (clinical staff, provider,  specific staff member): SEIPEL    What was the call regarding: CHRISTOFER COATES/ MAGDALENA WADE TELEPHONED TO ADVISE PATIENTS INSURANCE DENIED RENTAL AUTH FOR THE MACHINE. PLEASE CONTACT [facility] FOR PEER TO PEER DEPT @ - [phone] WITHIN 3 DAYS TO INITIATE APPEAL.

## 2023-08-28 NOTE — TELEPHONE ENCOUNTER
Provider: DR SEIPEL  Caller: MARNI  Relationship to Patient: DME COMPANY   Phone Number: [phone]  Reason for Call: MARNI CALLED FOR AN UPDATE ON GETTING PATIENT'S AUTH FOR CPAP. STATES IT IS STILL SHOWING HER THAT IT IS DENIED. PLEASE REVIEW, THANK YOU.

## 2023-08-29 NOTE — TELEPHONE ENCOUNTER
With medicare rules if pt was non compliance cpap failure, to restart the process after an in lab study they get a machine,  So this insurance is saying one failure and you are out for the rest of your life?  She can purchase a cpap from cpap[facility]  or [facility]...

## 2023-08-31 ENCOUNTER — TELEPHONE (OUTPATIENT)
Dept: NEUROLOGY | Facility: CLINIC | Age: 63
End: 2023-08-31
Payer: COMMERCIAL

## 2023-08-31 NOTE — TELEPHONE ENCOUNTER
Provider: SEIPEL  Caller: PATIENT  Relationship to Patient: SELF  Phone Number: [phone]  Reason for Call: PT RECEIVED A LETTER FROM [facility] STATING HER CPAP MACHINE HAS BEEN DENIED.  PT CALLED [facility] AND WAS TOLD THAT THE PROVIDER WOULD HAVE TO CALL [facility] AND APPEAL.      PT HAS [facility]  THEY TOLD PT SOMEONE FROM THE OFFICE NEEDS TO CALL PHONE # [phone] BETWEEN THE HOURS OF 7 A.M. AND 7 P.M.     PATIENT WOULD ALSO LIKE A CALL BACK PLEASE REGARDING THIS MATTER.    PLEASE REVIEW AND ADVISE     THANK YOU

## 2023-09-01 NOTE — TELEPHONE ENCOUNTER
Caller: FIGUEROA    Relationship: N/A    Best call back number: [phone]    What is the best time to reach you: ANY    Who are you requesting to speak with (clinical staff, provider,  specific staff member): SEIPEL    What was the call regarding: TELEPHONED TO ADVISE CPAP ORDER WAS DENIED BY PATIENTS [employer] INSURANCE. SHE ADVISES THAT MD HAS 3 DAYS TO SET UP PEER TO PEER FOR APPEAL.    PLEASE CALL & ADVISE RE: STATUS OF PEER TO PEER FOR APPEAL- THANK YOU

## 2023-09-06 NOTE — TELEPHONE ENCOUNTER
Spoke with patient and will send her [employer] message of what she can do next if she wishes to get cpap.

## 2023-09-11 ENCOUNTER — TELEPHONE (OUTPATIENT)
Dept: NEUROLOGY | Facility: CLINIC | Age: 63
End: 2023-09-11
Payer: COMMERCIAL

## 2023-09-11 NOTE — TELEPHONE ENCOUNTER
FIGUEROA AT [facility] IS CALLING REGARDING PT C-PAP. SHE STATES INSURANCE  INFORMED HER  OUR OFFICE  NEEDS TO DO A PEER TO PEER.?       PEER  #[phone]     SINCE PT HAS HAD A NEW SLEEP STUDY AND TRYING TO GET SET BACK UP SHE IS ELIGIBLE FOR NEW MACHINE.    NEEDS PEER TO PEER TO GET NEW MACHINE    PLEASE ADVISE

## 2023-10-06 ENCOUNTER — TELEPHONE (OUTPATIENT)
Dept: NEUROLOGY | Facility: CLINIC | Age: 63
End: 2023-10-06
Payer: COMMERCIAL

## 2023-10-06 NOTE — TELEPHONE ENCOUNTER
[facility] called, patient can not afford sleep machine right now due to out of network benefits.   She was told to go another way; [email or web address] etc. [facility] will be closing out the order on her today.

## 2023-11-03 ENCOUNTER — HOSPITAL ENCOUNTER (OUTPATIENT)
Dept: GENERAL RADIOLOGY | Facility: HOSPITAL | Age: 63
Discharge: HOME | End: 2023-11-03
Payer: COMMERCIAL

## 2023-11-03 ENCOUNTER — PRE-ADMISSION TESTING (OUTPATIENT)
Dept: PREADMISSION TESTING | Facility: HOSPITAL | Age: 63
End: 2023-11-03
Payer: COMMERCIAL

## 2023-11-03 VITALS
DIASTOLIC BLOOD PRESSURE: 84 MMHG | BODY MASS INDEX: 31.18 KG/M2 | WEIGHT: 194 LBS | OXYGEN SATURATION: 96 % | HEIGHT: 66 IN | SYSTOLIC BLOOD PRESSURE: 158 MMHG | TEMPERATURE: 98.2 F | HEART RATE: 91 BPM | RESPIRATION RATE: 18 BRPM

## 2023-11-03 LAB
ALBUMIN SERPL-MCNC: 3.5 G/DL (ref 3.5–5.2)
ALBUMIN/GLOB SERPL: 1.3 G/DL
ALP SERPL-CCNC: 54 U/L (ref 39–117)
ALT SERPL W P-5'-P-CCNC: 12 U/L (ref 1–33)
ANION GAP SERPL CALCULATED.3IONS-SCNC: 7.5 MMOL/L (ref 5–15)
APTT PPP: 26.3 SECONDS (ref 22.7–35.4)
AST SERPL-CCNC: 15 U/L (ref 1–32)
BACTERIA UR QL AUTO: ABNORMAL /HPF
BILIRUB SERPL-MCNC: <0.2 MG/DL (ref 0–1.2)
BILIRUB UR QL STRIP: NEGATIVE
BUN SERPL-MCNC: 21 MG/DL (ref 8–23)
BUN/CREAT SERPL: 30.9 (ref 7–25)
CALCIUM SPEC-SCNC: 8.9 MG/DL (ref 8.6–10.5)
CHLORIDE SERPL-SCNC: 104 MMOL/L (ref 98–107)
CLARITY UR: CLEAR
CO2 SERPL-SCNC: 25.5 MMOL/L (ref 22–29)
COLOR UR: YELLOW
CREAT SERPL-MCNC: 0.68 MG/DL (ref 0.57–1)
DEPRECATED RDW RBC AUTO: 44.3 FL (ref 37–54)
EGFRCR SERPLBLD CKD-EPI 2021: 99.2 ML/MIN/1.73
ERYTHROCYTE [DISTWIDTH] IN BLOOD BY AUTOMATED COUNT: 13.3 % (ref 12.3–15.4)
GLOBULIN UR ELPH-MCNC: 2.7 GM/DL
GLUCOSE SERPL-MCNC: 96 MG/DL (ref 65–99)
GLUCOSE UR STRIP-MCNC: NEGATIVE MG/DL
HCT VFR BLD AUTO: 38.1 % (ref 34–46.6)
HGB BLD-MCNC: 12.7 G/DL (ref 12–15.9)
HGB UR QL STRIP.AUTO: NEGATIVE
HYALINE CASTS UR QL AUTO: ABNORMAL /LPF
INR PPP: 1.02 (ref 0.9–1.1)
KETONES UR QL STRIP: NEGATIVE
LEUKOCYTE ESTERASE UR QL STRIP.AUTO: NEGATIVE
MCH RBC QN AUTO: 30.3 PG (ref 26.6–33)
MCHC RBC AUTO-ENTMCNC: 33.3 G/DL (ref 31.5–35.7)
MCV RBC AUTO: 90.9 FL (ref 79–97)
NITRITE UR QL STRIP: NEGATIVE
PH UR STRIP.AUTO: 5.5 [PH] (ref 5–8)
PLATELET # BLD AUTO: 271 10*3/MM3 (ref 140–450)
PMV BLD AUTO: 9.3 FL (ref 6–12)
POTASSIUM SERPL-SCNC: 4.5 MMOL/L (ref 3.5–5.2)
PROT SERPL-MCNC: 6.2 G/DL (ref 6–8.5)
PROT UR QL STRIP: NEGATIVE
PROTHROMBIN TIME: 13.5 SECONDS (ref 11.7–14.2)
QT INTERVAL: 388 MS
QTC INTERVAL: 428 MS
RBC # BLD AUTO: 4.19 10*6/MM3 (ref 3.77–5.28)
RBC # UR STRIP: ABNORMAL /HPF
REF LAB TEST METHOD: ABNORMAL
SODIUM SERPL-SCNC: 137 MMOL/L (ref 136–145)
SP GR UR STRIP: 1.02 (ref 1–1.03)
SQUAMOUS #/AREA URNS HPF: ABNORMAL /HPF
UROBILINOGEN UR QL STRIP: NORMAL
WBC # UR STRIP: ABNORMAL /HPF
WBC NRBC COR # BLD: 8.32 10*3/MM3 (ref 3.4–10.8)

## 2023-11-03 PROCEDURE — 85610 PROTHROMBIN TIME: CPT

## 2023-11-03 PROCEDURE — 36415 COLL VENOUS BLD VENIPUNCTURE: CPT

## 2023-11-03 PROCEDURE — 93005 ELECTROCARDIOGRAM TRACING: CPT

## 2023-11-03 PROCEDURE — 81001 URINALYSIS AUTO W/SCOPE: CPT

## 2023-11-03 PROCEDURE — 71046 X-RAY EXAM CHEST 2 VIEWS: CPT

## 2023-11-03 PROCEDURE — 93010 ELECTROCARDIOGRAM REPORT: CPT | Performed by: INTERNAL MEDICINE

## 2023-11-03 PROCEDURE — 80053 COMPREHEN METABOLIC PANEL: CPT

## 2023-11-03 PROCEDURE — 85027 COMPLETE CBC AUTOMATED: CPT

## 2023-11-03 PROCEDURE — 85730 THROMBOPLASTIN TIME PARTIAL: CPT

## 2023-11-03 RX ORDER — AMOXICILLIN 500 MG/1
500 CAPSULE ORAL 4 TIMES DAILY
COMMUNITY

## 2023-11-03 RX ORDER — TRAMADOL HYDROCHLORIDE 50 MG/1
50 TABLET ORAL EVERY 6 HOURS PRN
COMMUNITY

## 2023-11-03 RX ORDER — MELOXICAM 15 MG/1
15 TABLET ORAL DAILY
COMMUNITY

## 2023-11-03 RX ORDER — SIMETHICONE 125 MG
125 TABLET,CHEWABLE ORAL 3 TIMES DAILY
COMMUNITY

## 2023-11-03 NOTE — DISCHARGE INSTRUCTIONS
Take the following medications the morning of surgery: CLONAZEPAM, ESCITALOPRAM AND LEVOTHYROXINE      If you are on prescription narcotic pain medication to control your pain you may also take that medication the morning of surgery.    General Instructions:  Do not eat solid food after midnight the night before surgery.  You may drink clear liquids day of surgery but must stop at least one hour before your hospital arrival time.  It is beneficial for you to have a clear drink that contains carbohydrates the day of surgery.  We suggest a 12 to 20 ounce bottle of Gatorade or Powerade for non-diabetic patients or a 12 to 20 ounce bottle of G2 or Powerade Zero for diabetic patients. (Pediatric patients, are not advised to drink a 12 to 20 ounce carbohydrate drink)    Clear liquids are liquids you can see through.  Nothing red in color.     Plain water                               Sports drinks  Sodas                                   Gelatin (Jell-O)  Fruit juices without pulp such as white grape juice and apple juice  Popsicles that contain no fruit or yogurt  Tea or coffee (no cream or milk added)  Gatorade / Powerade  G2 / Powerade Zero    Infants may have breast milk up to four hours before surgery.  Infants drinking formula may drink formula up to six hours before surgery.   Patients who avoid smoking, chewing tobacco and alcohol for 4 weeks prior to surgery have a reduced risk of post-operative complications.  Quit smoking as many days before surgery as you can.  Do not smoke, use chewing tobacco or drink alcohol the day of surgery.   If applicable bring your C-PAP/ BI-PAP machine in with you to preop day of surgery.  Bring any papers given to you in the doctor’s office.  Wear clean comfortable clothes.  Do not wear contact lenses, false eyelashes or make-up.  Bring a case for your glasses.   Bring crutches or walker if applicable.  Remove all piercings.  Leave jewelry and any other valuables at home.  Hair  extensions with metal clips must be removed prior to surgery.  The Pre-Admission Testing nurse will instruct you to bring medications if unable to obtain an accurate list in Pre-Admission Testing.        If you were given a blood bank ID arm band remember to bring it with you the day of surgery.    Day of surgery:  Your arrival time is approximately two hours before your scheduled surgery time.  Upon arrival, a Pre-op nurse and Anesthesiologist will review your health history, obtain vital signs, and answer questions you may have.  The only belongings needed at this time will be a list of your home medications and if applicable your C-PAP/BI-PAP machine.  A Pre-op nurse will start an IV and you may receive medication in preparation for surgery, including something to help you relax.     Please be aware that surgery does come with discomfort.  We want to make every effort to control your discomfort so please discuss any uncontrolled symptoms with your nurse.   Your doctor will most likely have prescribed pain medications.      If you are going home after surgery you will receive individualized written care instructions before being discharged.  A responsible adult must drive you to and from the hospital on the day of your surgery and stay with you for 24 hours.  Discharge prescriptions can be filled by the hospital pharmacy during regular pharmacy hours.  If you are having surgery late in the day/evening your prescription may be e-prescribed to your pharmacy.  Please verify your pharmacy hours or chose a 24 hour pharmacy to avoid not having access to your prescription because your pharmacy has closed for the day.    If you are staying overnight following surgery, you will be transported to your hospital room following the recovery period.  [facility] has all private rooms.    If you have any questions please call Pre-Admission Testing at [phone].  Deductibles and co-payments are collected on the  day of service. Please be prepared to pay the required co-pay, deductible or deposit on the day of service as defined by your plan.    Call your surgeon immediately if you experience any of the following symptoms:  Sore Throat  Shortness of Breath or difficulty breathing  Cough  Chills  Body soreness or muscle pain  Headache  Fever  New loss of taste or smell  Do not arrive for your surgery ill.  Your procedure will need to be rescheduled to another time.  You will need to call your physician before the day of surgery to avoid any unnecessary exposure to hospital staff as well as other patients.        PREVENTING INFECTION IN SHOULDER SURGICAL SITES     C. acnes is a bacteria that lives deep within follicles and pores of the skin. It is found in large numbers on the skin of the face, axilla (armpit), chest and back and is the primary bacteria to cause a surgical site infection after shoulder surgery.      Use of a Benzoyl Peroxide solution prior to shoulder surgery decreases C. acnes and reduces post-op infections.   Your surgeon has ordered 5% Benzoyl Peroxide wash to be used three times prior to your surgery.     Please read the following instructions carefully and bring this form with you the day of surgery.     General bathing instructions starting two days before your surgery:    Shower using a fresh bar of anti-bacterial soap (such as Dial) and clean washcloth.  Pay special attention to the neck, shoulder and armpit area.   Wash your hair as usual with your regular shampoo.   Rinse hair and body thoroughly with warm water (not hot water) to remove shampoo and residue.   Dry with a clean towel.              Sleep in a clean bed with clean clothing.  Do not allow pets to sleep with you.     For 2 days before surgery, avoid shaving with a razor because the razor can irritate skin and make it easier to develop an infection.    Any areas of open skin can increase the risk of a post-operative wound infection by  allowing bacteria to enter and travel throughout the body.  Notify your surgeon if you have any skin wounds / rashes even if it is not near the expected surgical site.  The area will need assessed to determine if surgery should be delayed until it is healed.      First application of 5% Benzoyl Peroxide Wash two nights before surgery:                                                                Wash neck, shoulder (front, back, side) and armpit   with warm water, rinse and dry - see picture.  Gently wash the same areas with the Benzoyl Peroxide   cleanser going away from the neck for 10-20 seconds.   Work into a full lather and leave on the skin for   2 minutes for greatest effect.  Rinse thoroughly with warm water, not hot water.                     Pat dry with a clean towel.                                                            Wash your hands thoroughly.  Do not apply lotion, powder, perfume or deodorant.   Put on clean clothes.    Second application the night before surgery:  Repeat the above steps.        Third application morning of surgery:  Repeat the above steps.    Due to shoulder pain or decreased range of motion of your shoulder and arm, you may need assistance washing under the arm or the back portion of your shoulder.     Avoid further washing the areas of the skin treated with Benzoyl Peroxide for at least 1 hour.    For your convenience, you may purchase Benzoyl Peroxide at [facility] retail pharmacy.     Warning:  Let your physician know if you are allergic to Benzoyl Peroxide or have very sensitive skin and cannot use it.   Stop using and contact your surgeon if you experience any excessive scaling, itching, swelling, skin irritation or other signs of a reaction.  Keep out of eyes, ears, nose and mouth.  Do not apply to sunburned, irritated or broken area on the skin.  Avoid unwanted problems with bleaching effect by following these tips:  Wash hands after each use.  Avoid contact with  hair, clothing, furnishings or carpeting.  Wear clean, old t-shirt or clothing to bed.   Use clean, old white pillow cases and sheets to avoid discoloring your bed linens.                                                                                                                                                                                                                                                                                   Please complete the checklist below, bring it with you to the hospital                               the day of surgery and give to the Pre-op Nurse     Preoperative Skin Prep Checklist        Patient Name Label             Enter dates and ?  boxes to indicate completed    Surgery Date: _______________ Regular Shower  Benzoyl Peroxide Wash   First Application:  2 days before_______________  (Stop shaving all body parts)           Morning or Evening                        Evening    Second Application:  1 day  before________________        Morning or Evening                          Evening   Third Application:  Day of Surgery______________                           Morning                   Morning

## 2023-11-22 ENCOUNTER — TELEPHONE (OUTPATIENT)
Dept: ORTHOPEDIC SURGERY | Facility: HOSPITAL | Age: 63
End: 2023-11-22
Payer: COMMERCIAL

## 2023-11-22 NOTE — TELEPHONE ENCOUNTER
Risk Factor yes no   Age >75  X   BMI <20 >40  X   Patient History     Chronic Pain (2 or more meds/Pain Management) X    ETOH (more than 3 drinks Daily)  X   Uncontrolled Depression/Bipolar/Schizoaffective Disorder  X   Arrhythmias--  X   Stent placement/MI  X   DVT/PE  X   Pacemaker  X   HTN (uncontrolled or requiring more than 2 medications)  X   CHF/Retained fluids/Edema  X   Stroke with Residual   X   COPD/Asthma  X   REINALDO--Non-compliant with CPAP  X   Diabetes (on insulin or more than 2 meds)         A1C:  X   BPH/Urinary retention (on medication)  X   CKD  X   Home environment and support     Current ambulation status (use of cane, walker, W/C, Multiple falls/weakness)  X   Stairs to enter and throughout home X    Lives Alone  X   Doesn't have support at home  X   Outpatient Screening Assessment    Home needs: (Walker/BSC):  Total Shoulder   ? Steps 5 landing 5   Caregiver 24-48hrs post-discharge: Inocencio     Discharge Plan:   Total Shoulder     Prescriptions: Meds to bed  Home medications:   [] Blood thinner/anti-coag therapy--   [] BPH or diuretic--  [] BP meds--   ? Pain/Anti-inflammatories-- Mobic, Ultram   Pre-op Education:  Educate patient on spinal anesthesia/pain control:  ? patient verbalize understanding    Educate patient on hospital course/timeline:  ?  patient verbalize understanding    Joint Care Class:  ?  yes [] no  Notes:

## 2023-11-28 NOTE — H&P
Chief Complaint  None recorded.  Patient's Care Team  Primary Care Provider: ARRON HINKLE MD: [address] [place] [address], Ph [phone], Fax [phone] NPI: [id number]  Patient's Pharmacies  [facility] ([facility]): [address] [place] [address], Ph [phone], Fax [phone]  Vitals  2023-10-10 11:13  Ht: 5 ft 5 in  Allergies  Reviewed Allergies  ATIVAN: - Critical  Category: Drug   CODEINE: - Reaction: NO REACTION GIVEN - Critical  Category: Drug   HYDROCODONE: - Reaction: VOMIT, ITCHING - Critical  Category: Drug   HYDROCODONE: - Reaction: VOMIT, ITCHING - Critical  Category: Drug   OMNICEF: - Reaction: CAUSES DIARRHEA - Critical  Category: Drug   Uncoded Allergies  DROPERIDEL? (AGITATION, ITCHING, SWEATS - Critical) 05/19/2020 (Active)  Medications  Reviewed Medications  clonazePAM 0.5 mg tablet  TAKE ONE (1) TABLET BY MOUTH TWICE DAILY  09/14/23   filled surescripts  Creon 36,000 unit-114,000 unit-180,000 unit capsule,delayed release  TAKE ONE (1) CAPSULE BY MOUTH THREE TIMES DAILY WITH MEALS  09/25/23   filled surescripts  dextroamphetamine-amphetamine 20 mg tablet  TAKE ONE (1) TABLET BY MOUTH TWICE DAILY  09/07/23   filled surescripts  dicyclomine 20 mg tablet  TAKE ONE (1) TABLET BY MOUTH TWICE DAILY  10/03/23   filled surescripts  divalproex  mg tablet,extended release 24 hr  TAKE THREE (3) TABLETS BY MOUTH EVERY NIGHT AT BEDTIME  10/06/23   filled surescripts  doxycycline hyclate 50 mg capsule  TAKE ONE (1) CAPSULE BY MOUTH DAILY WITH food AND WATER (two hours APART from dairy, calcium, AND iron)  03/02/22   filled surescripts  doxycycline monohydrate 50 mg tablet  10/03/23   filled surescripts  escitalopram 20 mg tablet  10/06/23   filled surescripts  estradioL 0.5 mg tablet  10/05/23   filled surescripts  hydrOXYzine pamoate 25 mg capsule  07/19/23   filled surescripts  Impoyz 0.025 % topical cream  12/30/22   filled surescripts  levothyroxine  100 mcg tablet  TAKE ONE (1) TABLET BY MOUTH EVERY MORNING ON AN EMPTY STOMACH  09/08/23   filled surescripts  meloxicam 15 mg tablet  TAKE 1 TABLET BY MOUTH EVERY DAY   10/03/23   taco Thapa MD  metFORMIN  mg tablet,extended release 24 hr  10/04/23   filled surescripts  ondansetron 4 mg disintegrating tablet  06/14/23   filled surescripts  pantoprazole 40 mg tablet,delayed release  TAKE ONE (1) TABLET BY MOUTH EVERY DAY  10/04/23   filled surescripts  pramipexole 0.25 mg tablet  TAKE ONE (1) TABLET BY MOUTH TWICE DAILY  10/06/23   filled surescripts  proGESTerone micronized 100 mg capsule  TAKE ONE (1) CAPSULE BY MOUTH EVERY DAY (must keep appt ON 3/15/22)  10/05/23   filled surescripts  rosuvastatin 10 mg tablet  TAKE ONE (1) TABLET BY MOUTH EVERY DAY  10/03/23   filled surescripts  simethicone 125 mg chewable tablet  CHEW ONE (1) TABLET BY MOUTH THREE TIMES DAILY AFTER MEALS  04/27/22   filled surescripts  zolpidem 10 mg tablet  09/30/23   filled surescripts  Vaccines  Reviewed Vaccines  Vaccine Type Date Amt. Route Site NDC Lot # Mfr. Exp.  Date VIS VIS  Given Vaccinator  Influenza  influenza, injectable, quadrivalent 11/01/21            Problems  Reviewed Problems  Arthropathy of lumbar facet joint - Onset: 09/06/2022  Lumbar spondylosis - Onset: 09/06/2022  Chronic low back pain - Onset: 09/06/2022  Myofascial pain syndrome of lower back - Onset: 11/22/2022  Bilateral sacroiliac joint pain - Onset: 09/06/2022  Pain of right shoulder joint - Onset: 06/23/2023  Osteoarthritis of right glenohumeral joint - Onset: 06/23/2023  Family History  Reviewed Family History  Paternal Grandfather - Family history of cancer  - lung  Paternal Grandmother - Family history of cancer  - colon    - Family history of stroke    - Rheumatoid arthritis  Father - Diabetes mellitus    - Family history of stroke  Son - Diabetes mellitus  Unspecified Relation - Family history of cancer  Maternal Grandfather - Heart  disease  Social History  Reviewed Social History  Public Health and Travel  Have you recently traveled abroad?: No  Have you been to an area known to be high risk for COVID-19?: No  In the 14 days before symptom onset, have you had close contact with a laboratory-confirmed COVID-19 while that case was ill?: No  In the 14 days before symptom onset, have you had close contact with a person who is under investigation for COVID-19 while that person was ill?: No  Substance Use  Do you or have you ever smoked tobacco?: Former smoker  How many years have you smoked tobacco?: 10  When did you quit smoking?: 16+ years since last cigarette  At what age did you start smoking tobacco?: 20  Do you or have you ever used any other forms of tobacco or nicotine?: No  Do you or have you ever used e-cigarettes or vape?: Former user of electronic cigarettes  Do you or have you ever used smokeless tobacco?: Never used smokeless tobacco  How many years have you used e-cigarettes or vape?: 2  Has tobacco cessation counseling been provided?: No  What is your level of alcohol consumption?: None  Do you use any illicit or recreational drugs?: No  Advance Directive  Do you have an advance directive?: Yes  Do you have a medical power of ?: Yes  Surgical History  Reviewed Surgical History  Gallbladder Surgery  Hysterectomy  Shoulder Surgery  Stent  Operative procedure on rectum and/or anus  Procedure on back  GYN History  Most Recent Bone Density: 05/05/2020.  Was the recent bone density a DEXA or DXA?: Y.  Past Medical History  Reviewed Past Medical History  Anxiety Disorder: Y  Arthritis: Y  Bleeding Disorder: Y  Cancer: Y - cervical  Depression: Y  Diabetes: Y - pre  Fibromyalgia: Y  Gastrointestinal Disease/GERD: Y  Headaches/Migraines: Y  High Cholesterol: Y  Neurologic Disorder: Y  Sleep Apnea: Y  Thyroid Disease: Y  HPI  Patient is here for some continuing and worsening pain from her right shoulder. It has been around 3 months  since her last injection. She has been attending physical therapy and doing a home exercise program. She has seen a worsening of her pain over the last few months. She cannot sleep or lift even light objects without significant pain. She cannot sleep on her right side. She is having difficulty doing simple day-to-day activities such as cleaning and getting dressed.  Physical Exam  Right shoulder was examined while standing. Skin is normal. There is no atrophy. There is no effusion. There is no warmth. No erythema. Lymphadenopathy is negative.  Shoulder active ROM today shows:  Elevation = 120;  ER(side) = 30;  ER(abd) = 70;  IR(abd) = 50;  IR(vert) = to waist;  Abduction = 80.  Shoulder strength: Elevation = 3/5; ER = 3+/5; IR = 4-/5; ABD = 3+/5.  Neer test is positive. Travis test is positive. Arc of motion is positive. Empty can test was positive. Pulses are normal. Normal sensation. Capillary refill is normal.  Assessment / Plan  1. Osteoarthritis of right glenohumeral joint  M19.011: Primary osteoarthritis, right shoulder    Discussion Notes  In the past year she has had multiple injections as well as attending physical therapy more than once. She is having worsening of her pain. We did an MRI scan a couple months ago that showed an intact rotator cuff with significant glenohumeral osteoarthritis. Considering the failure of our nonoperative care I suggest we proceed with right total shoulder arthroplasty. She is in agreement with this. We discussed the procedure in detail. We discussed the risk of the surgery that include infection, nerve injury, instability or failure of the implant requiring further surgery amongst other possible complications. We discussed the need for physical therapy and the lengthy recovery. For this procedure. All questions were answered and informed consent was obtained.

## 2023-11-29 ENCOUNTER — APPOINTMENT (OUTPATIENT)
Dept: GENERAL RADIOLOGY | Facility: HOSPITAL | Age: 63
End: 2023-11-29
Payer: COMMERCIAL

## 2023-11-29 ENCOUNTER — HOSPITAL ENCOUNTER (OUTPATIENT)
Facility: HOSPITAL | Age: 63
Setting detail: HOSPITAL OUTPATIENT SURGERY
Discharge: HOME | End: 2023-11-29
Attending: ORTHOPAEDIC SURGERY | Admitting: ORTHOPAEDIC SURGERY
Payer: COMMERCIAL

## 2023-11-29 ENCOUNTER — ANESTHESIA (OUTPATIENT)
Dept: PERIOP | Facility: HOSPITAL | Age: 63
End: 2023-11-29
Payer: COMMERCIAL

## 2023-11-29 ENCOUNTER — ANESTHESIA EVENT (OUTPATIENT)
Dept: PERIOP | Facility: HOSPITAL | Age: 63
End: 2023-11-29
Payer: COMMERCIAL

## 2023-11-29 VITALS
TEMPERATURE: 98.4 F | RESPIRATION RATE: 16 BRPM | SYSTOLIC BLOOD PRESSURE: 120 MMHG | HEART RATE: 81 BPM | OXYGEN SATURATION: 92 % | DIASTOLIC BLOOD PRESSURE: 69 MMHG

## 2023-11-29 PROCEDURE — 25810000003 LACTATED RINGERS PER 1000 ML: Performed by: ANESTHESIOLOGY

## 2023-11-29 PROCEDURE — L3670 SO ACRO/CLAV CAN WEB PRE OTS: HCPCS | Performed by: ORTHOPAEDIC SURGERY

## 2023-11-29 PROCEDURE — C1776 JOINT DEVICE (IMPLANTABLE): HCPCS | Performed by: ORTHOPAEDIC SURGERY

## 2023-11-29 PROCEDURE — C1713 ANCHOR/SCREW BN/BN,TIS/BN: HCPCS | Performed by: ORTHOPAEDIC SURGERY

## 2023-11-29 PROCEDURE — 25010000002 MIDAZOLAM PER 1 MG: Performed by: ANESTHESIOLOGY

## 2023-11-29 PROCEDURE — 25010000002 PHENYLEPHRINE 10 MG/ML SOLUTION 5 ML VIAL: Performed by: NURSE ANESTHETIST, CERTIFIED REGISTERED

## 2023-11-29 PROCEDURE — 25810000003 SODIUM CHLORIDE 0.9 % SOLUTION 250 ML FLEX CONT: Performed by: NURSE ANESTHETIST, CERTIFIED REGISTERED

## 2023-11-29 PROCEDURE — 63710000001 PROMETHAZINE PER 25 MG: Performed by: NURSE ANESTHETIST, CERTIFIED REGISTERED

## 2023-11-29 PROCEDURE — 25010000002 FENTANYL CITRATE (PF) 50 MCG/ML SOLUTION: Performed by: NURSE ANESTHETIST, CERTIFIED REGISTERED

## 2023-11-29 PROCEDURE — 97110 THERAPEUTIC EXERCISES: CPT

## 2023-11-29 PROCEDURE — 25010000002 ONDANSETRON PER 1 MG: Performed by: NURSE ANESTHETIST, CERTIFIED REGISTERED

## 2023-11-29 PROCEDURE — 25010000002 PHENYLEPHRINE 10 MG/ML SOLUTION: Performed by: NURSE ANESTHETIST, CERTIFIED REGISTERED

## 2023-11-29 PROCEDURE — 73020 X-RAY EXAM OF SHOULDER: CPT

## 2023-11-29 PROCEDURE — 25810000003 SODIUM CHLORIDE PER 500 ML: Performed by: ORTHOPAEDIC SURGERY

## 2023-11-29 PROCEDURE — 25010000002 CEFAZOLIN IN DEXTROSE 2-4 GM/100ML-% SOLUTION: Performed by: ORTHOPAEDIC SURGERY

## 2023-11-29 PROCEDURE — 97165 OT EVAL LOW COMPLEX 30 MIN: CPT

## 2023-11-29 PROCEDURE — 25010000002 DEXAMETHASONE SODIUM PHOSPHATE 20 MG/5ML SOLUTION: Performed by: NURSE ANESTHETIST, CERTIFIED REGISTERED

## 2023-11-29 PROCEDURE — 25010000002 ROPIVACAINE PER 1 MG: Performed by: ANESTHESIOLOGY

## 2023-11-29 PROCEDURE — 25010000002 PROPOFOL 200 MG/20ML EMULSION: Performed by: NURSE ANESTHETIST, CERTIFIED REGISTERED

## 2023-11-29 PROCEDURE — 25010000002 DEXAMETHASONE PER 1 MG: Performed by: ANESTHESIOLOGY

## 2023-11-29 PROCEDURE — 25010000002 SUGAMMADEX 200 MG/2ML SOLUTION: Performed by: NURSE ANESTHETIST, CERTIFIED REGISTERED

## 2023-11-29 DEVICE — KNOTLESS TISSUE CONTROL DEVICE, UNDYED UNIDIRECTIONAL (ANTIBACTERIAL) SYNTHETIC ABSORBABLE DEVICE
Type: IMPLANTABLE DEVICE | Site: SHOULDER | Status: FUNCTIONAL
Brand: STRATAFIX

## 2023-11-29 DEVICE — IMPLANTABLE DEVICE: Type: IMPLANTABLE DEVICE | Site: SHOULDER | Status: FUNCTIONAL

## 2023-11-29 DEVICE — IMPLANTABLE DEVICE
Type: IMPLANTABLE DEVICE | Site: SHOULDER | Status: FUNCTIONAL
Brand: COMPREHENSIVE SHOULDER SYSTEM

## 2023-11-29 DEVICE — POST MOD GLEN ALLIANCE/TM: Type: IMPLANTABLE DEVICE | Site: SHOULDER | Status: FUNCTIONAL

## 2023-11-29 DEVICE — IMPLANTABLE DEVICE
Type: IMPLANTABLE DEVICE | Site: HUMERUS | Status: FUNCTIONAL
Brand: COMPREHENSIVE® SHOULDER SYSTEM

## 2023-11-29 DEVICE — IMPLANTABLE DEVICE
Type: IMPLANTABLE DEVICE | Site: SHOULDER | Status: FUNCTIONAL
Brand: BIOMET® BONE CEMENT R

## 2023-11-29 DEVICE — TOTL SHLDER: Type: IMPLANTABLE DEVICE | Status: FUNCTIONAL

## 2023-11-29 DEVICE — WHITE/BLUE CO-BRAID POLYETHYLENE SIZE 2 38" HC-5 NEEDLE BIOMET
Type: IMPLANTABLE DEVICE | Site: SHOULDER | Status: FUNCTIONAL
Brand: TELEFLEX

## 2023-11-29 RX ORDER — PHENYLEPHRINE HYDROCHLORIDE 10 MG/ML
INJECTION INTRAVENOUS AS NEEDED
Status: DISCONTINUED | OUTPATIENT
Start: 2023-11-29 | End: 2023-11-29 | Stop reason: SURG

## 2023-11-29 RX ORDER — LABETALOL HYDROCHLORIDE 5 MG/ML
5 INJECTION, SOLUTION INTRAVENOUS
Status: DISCONTINUED | OUTPATIENT
Start: 2023-11-29 | End: 2023-11-29 | Stop reason: HOSPADM

## 2023-11-29 RX ORDER — ROPIVACAINE HYDROCHLORIDE 5 MG/ML
INJECTION, SOLUTION EPIDURAL; INFILTRATION; PERINEURAL
Status: COMPLETED | OUTPATIENT
Start: 2023-11-29 | End: 2023-11-29

## 2023-11-29 RX ORDER — LIDOCAINE HYDROCHLORIDE 20 MG/ML
INJECTION, SOLUTION EPIDURAL; INFILTRATION; INTRACAUDAL; PERINEURAL AS NEEDED
Status: DISCONTINUED | OUTPATIENT
Start: 2023-11-29 | End: 2023-11-29 | Stop reason: SURG

## 2023-11-29 RX ORDER — HYDROCODONE BITARTRATE AND ACETAMINOPHEN 5; 325 MG/1; MG/1
1 TABLET ORAL ONCE AS NEEDED
Status: DISCONTINUED | OUTPATIENT
Start: 2023-11-29 | End: 2023-11-29 | Stop reason: HOSPADM

## 2023-11-29 RX ORDER — FENTANYL CITRATE 50 UG/ML
25 INJECTION, SOLUTION INTRAMUSCULAR; INTRAVENOUS ONCE AS NEEDED
Status: DISCONTINUED | OUTPATIENT
Start: 2023-11-29 | End: 2023-11-29 | Stop reason: HOSPADM

## 2023-11-29 RX ORDER — ONDANSETRON 2 MG/ML
INJECTION INTRAMUSCULAR; INTRAVENOUS AS NEEDED
Status: DISCONTINUED | OUTPATIENT
Start: 2023-11-29 | End: 2023-11-29 | Stop reason: SURG

## 2023-11-29 RX ORDER — DIPHENHYDRAMINE HYDROCHLORIDE 50 MG/ML
12.5 INJECTION INTRAMUSCULAR; INTRAVENOUS
Status: DISCONTINUED | OUTPATIENT
Start: 2023-11-29 | End: 2023-11-29 | Stop reason: HOSPADM

## 2023-11-29 RX ORDER — FAMOTIDINE 10 MG/ML
20 INJECTION, SOLUTION INTRAVENOUS ONCE
Status: COMPLETED | OUTPATIENT
Start: 2023-11-29 | End: 2023-11-29

## 2023-11-29 RX ORDER — OXYCODONE HYDROCHLORIDE AND ACETAMINOPHEN 5; 325 MG/1; MG/1
1 TABLET ORAL EVERY 4 HOURS PRN
Qty: 40 TABLET | Refills: 0 | Status: SHIPPED | OUTPATIENT
Start: 2023-11-29

## 2023-11-29 RX ORDER — IPRATROPIUM BROMIDE AND ALBUTEROL SULFATE 2.5; .5 MG/3ML; MG/3ML
3 SOLUTION RESPIRATORY (INHALATION) ONCE AS NEEDED
Status: DISCONTINUED | OUTPATIENT
Start: 2023-11-29 | End: 2023-11-29 | Stop reason: HOSPADM

## 2023-11-29 RX ORDER — OXYCODONE AND ACETAMINOPHEN 7.5; 325 MG/1; MG/1
1 TABLET ORAL EVERY 4 HOURS PRN
Status: DISCONTINUED | OUTPATIENT
Start: 2023-11-29 | End: 2023-11-29 | Stop reason: HOSPADM

## 2023-11-29 RX ORDER — FENTANYL CITRATE 50 UG/ML
50 INJECTION, SOLUTION INTRAMUSCULAR; INTRAVENOUS
Status: DISCONTINUED | OUTPATIENT
Start: 2023-11-29 | End: 2023-11-29 | Stop reason: HOSPADM

## 2023-11-29 RX ORDER — DROPERIDOL 2.5 MG/ML
0.62 INJECTION, SOLUTION INTRAMUSCULAR; INTRAVENOUS
Status: DISCONTINUED | OUTPATIENT
Start: 2023-11-29 | End: 2023-11-29 | Stop reason: HOSPADM

## 2023-11-29 RX ORDER — PROPOFOL 10 MG/ML
INJECTION, EMULSION INTRAVENOUS AS NEEDED
Status: DISCONTINUED | OUTPATIENT
Start: 2023-11-29 | End: 2023-11-29 | Stop reason: SURG

## 2023-11-29 RX ORDER — NALOXONE HCL 0.4 MG/ML
0.2 VIAL (ML) INJECTION AS NEEDED
Status: DISCONTINUED | OUTPATIENT
Start: 2023-11-29 | End: 2023-11-29 | Stop reason: HOSPADM

## 2023-11-29 RX ORDER — MIDAZOLAM HYDROCHLORIDE 1 MG/ML
1 INJECTION INTRAMUSCULAR; INTRAVENOUS
Status: DISCONTINUED | OUTPATIENT
Start: 2023-11-29 | End: 2023-11-29 | Stop reason: HOSPADM

## 2023-11-29 RX ORDER — DEXAMETHASONE SODIUM PHOSPHATE 4 MG/ML
INJECTION, SOLUTION INTRA-ARTICULAR; INTRALESIONAL; INTRAMUSCULAR; INTRAVENOUS; SOFT TISSUE
Status: COMPLETED | OUTPATIENT
Start: 2023-11-29 | End: 2023-11-29

## 2023-11-29 RX ORDER — CEFAZOLIN SODIUM 2 G/100ML
2000 INJECTION, SOLUTION INTRAVENOUS ONCE
Status: COMPLETED | OUTPATIENT
Start: 2023-11-29 | End: 2023-11-29

## 2023-11-29 RX ORDER — SODIUM CHLORIDE 0.9 % (FLUSH) 0.9 %
3 SYRINGE (ML) INJECTION EVERY 12 HOURS SCHEDULED
Status: DISCONTINUED | OUTPATIENT
Start: 2023-11-29 | End: 2023-11-29 | Stop reason: HOSPADM

## 2023-11-29 RX ORDER — HYDRALAZINE HYDROCHLORIDE 20 MG/ML
5 INJECTION INTRAMUSCULAR; INTRAVENOUS
Status: DISCONTINUED | OUTPATIENT
Start: 2023-11-29 | End: 2023-11-29 | Stop reason: HOSPADM

## 2023-11-29 RX ORDER — ONDANSETRON 2 MG/ML
4 INJECTION INTRAMUSCULAR; INTRAVENOUS ONCE AS NEEDED
Status: COMPLETED | OUTPATIENT
Start: 2023-11-29 | End: 2023-11-29

## 2023-11-29 RX ORDER — MAGNESIUM HYDROXIDE 1200 MG/15ML
LIQUID ORAL AS NEEDED
Status: DISCONTINUED | OUTPATIENT
Start: 2023-11-29 | End: 2023-11-29 | Stop reason: HOSPADM

## 2023-11-29 RX ORDER — SCOLOPAMINE TRANSDERMAL SYSTEM 1 MG/1
1 PATCH, EXTENDED RELEASE TRANSDERMAL
Status: DISCONTINUED | OUTPATIENT
Start: 2023-11-29 | End: 2023-11-29 | Stop reason: HOSPADM

## 2023-11-29 RX ORDER — EPHEDRINE SULFATE 50 MG/ML
5 INJECTION, SOLUTION INTRAVENOUS ONCE AS NEEDED
Status: DISCONTINUED | OUTPATIENT
Start: 2023-11-29 | End: 2023-11-29 | Stop reason: HOSPADM

## 2023-11-29 RX ORDER — PROMETHAZINE HYDROCHLORIDE 25 MG/1
25 TABLET ORAL ONCE AS NEEDED
Status: COMPLETED | OUTPATIENT
Start: 2023-11-29 | End: 2023-11-29

## 2023-11-29 RX ORDER — DEXAMETHASONE SODIUM PHOSPHATE 4 MG/ML
INJECTION, SOLUTION INTRA-ARTICULAR; INTRALESIONAL; INTRAMUSCULAR; INTRAVENOUS; SOFT TISSUE AS NEEDED
Status: DISCONTINUED | OUTPATIENT
Start: 2023-11-29 | End: 2023-11-29 | Stop reason: SURG

## 2023-11-29 RX ORDER — PROMETHAZINE HYDROCHLORIDE 12.5 MG/1
12.5 TABLET ORAL EVERY 6 HOURS PRN
Qty: 30 TABLET | Refills: 0 | Status: SHIPPED | OUTPATIENT
Start: 2023-11-29

## 2023-11-29 RX ORDER — GLYCOPYRROLATE 0.2 MG/ML
INJECTION INTRAMUSCULAR; INTRAVENOUS AS NEEDED
Status: DISCONTINUED | OUTPATIENT
Start: 2023-11-29 | End: 2023-11-29 | Stop reason: SURG

## 2023-11-29 RX ORDER — SODIUM CHLORIDE, SODIUM LACTATE, POTASSIUM CHLORIDE, CALCIUM CHLORIDE 600; 310; 30; 20 MG/100ML; MG/100ML; MG/100ML; MG/100ML
9 INJECTION, SOLUTION INTRAVENOUS CONTINUOUS
Status: DISCONTINUED | OUTPATIENT
Start: 2023-11-29 | End: 2023-11-29 | Stop reason: HOSPADM

## 2023-11-29 RX ORDER — LIDOCAINE HYDROCHLORIDE 10 MG/ML
0.5 INJECTION, SOLUTION INFILTRATION; PERINEURAL ONCE AS NEEDED
Status: DISCONTINUED | OUTPATIENT
Start: 2023-11-29 | End: 2023-11-29 | Stop reason: HOSPADM

## 2023-11-29 RX ORDER — SODIUM CHLORIDE 9 MG/ML
INJECTION, SOLUTION INTRAVENOUS AS NEEDED
Status: DISCONTINUED | OUTPATIENT
Start: 2023-11-29 | End: 2023-11-29 | Stop reason: HOSPADM

## 2023-11-29 RX ORDER — FLUMAZENIL 0.1 MG/ML
0.2 INJECTION INTRAVENOUS AS NEEDED
Status: DISCONTINUED | OUTPATIENT
Start: 2023-11-29 | End: 2023-11-29 | Stop reason: HOSPADM

## 2023-11-29 RX ORDER — ROCURONIUM BROMIDE 10 MG/ML
INJECTION, SOLUTION INTRAVENOUS AS NEEDED
Status: DISCONTINUED | OUTPATIENT
Start: 2023-11-29 | End: 2023-11-29 | Stop reason: SURG

## 2023-11-29 RX ORDER — HYDROMORPHONE HYDROCHLORIDE 1 MG/ML
0.5 INJECTION, SOLUTION INTRAMUSCULAR; INTRAVENOUS; SUBCUTANEOUS
Status: DISCONTINUED | OUTPATIENT
Start: 2023-11-29 | End: 2023-11-29 | Stop reason: HOSPADM

## 2023-11-29 RX ORDER — SODIUM CHLORIDE 0.9 % (FLUSH) 0.9 %
3-10 SYRINGE (ML) INJECTION AS NEEDED
Status: DISCONTINUED | OUTPATIENT
Start: 2023-11-29 | End: 2023-11-29 | Stop reason: HOSPADM

## 2023-11-29 RX ORDER — PROMETHAZINE HYDROCHLORIDE 25 MG/1
25 SUPPOSITORY RECTAL ONCE AS NEEDED
Status: COMPLETED | OUTPATIENT
Start: 2023-11-29 | End: 2023-11-29

## 2023-11-29 RX ADMIN — OXYCODONE AND ACETAMINOPHEN 1 TABLET: 7.5; 325 TABLET ORAL at 09:39

## 2023-11-29 RX ADMIN — PROMETHAZINE HYDROCHLORIDE 25 MG: 25 TABLET ORAL at 09:15

## 2023-11-29 RX ADMIN — ROCURONIUM BROMIDE 50 MG: 10 INJECTION, SOLUTION INTRAVENOUS at 07:06

## 2023-11-29 RX ADMIN — CEFAZOLIN SODIUM 2000 MG: 2 INJECTION, SOLUTION INTRAVENOUS at 06:57

## 2023-11-29 RX ADMIN — PHENYLEPHRINE HYDROCHLORIDE 100 MCG: 10 INJECTION, SOLUTION INTRAVENOUS at 07:20

## 2023-11-29 RX ADMIN — DEXAMETHASONE SODIUM PHOSPHATE 8 MG: 4 INJECTION, SOLUTION INTRAMUSCULAR; INTRAVENOUS at 07:09

## 2023-11-29 RX ADMIN — SCOPALAMINE 1 PATCH: 1 PATCH, EXTENDED RELEASE TRANSDERMAL at 06:44

## 2023-11-29 RX ADMIN — ONDANSETRON 4 MG: 2 INJECTION INTRAMUSCULAR; INTRAVENOUS at 09:02

## 2023-11-29 RX ADMIN — PHENYLEPHRINE HYDROCHLORIDE 0.8 MCG/KG/MIN: 10 INJECTION, SOLUTION INTRAVENOUS at 07:28

## 2023-11-29 RX ADMIN — LIDOCAINE HYDROCHLORIDE 100 MG: 20 INJECTION, SOLUTION EPIDURAL; INFILTRATION; INTRACAUDAL; PERINEURAL at 07:06

## 2023-11-29 RX ADMIN — FAMOTIDINE 20 MG: 10 INJECTION INTRAVENOUS at 06:26

## 2023-11-29 RX ADMIN — ROPIVACAINE HYDROCHLORIDE 20 ML: 5 INJECTION EPIDURAL; INFILTRATION; PERINEURAL at 06:45

## 2023-11-29 RX ADMIN — PROPOFOL 200 MG: 10 INJECTION, EMULSION INTRAVENOUS at 07:06

## 2023-11-29 RX ADMIN — FENTANYL CITRATE 50 MCG: 50 INJECTION, SOLUTION INTRAMUSCULAR; INTRAVENOUS at 09:15

## 2023-11-29 RX ADMIN — PHENYLEPHRINE HYDROCHLORIDE 100 MCG: 10 INJECTION, SOLUTION INTRAVENOUS at 07:14

## 2023-11-29 RX ADMIN — SODIUM CHLORIDE, POTASSIUM CHLORIDE, SODIUM LACTATE AND CALCIUM CHLORIDE 9 ML/HR: 600; 310; 30; 20 INJECTION, SOLUTION INTRAVENOUS at 06:07

## 2023-11-29 RX ADMIN — ONDANSETRON 4 MG: 2 INJECTION INTRAMUSCULAR; INTRAVENOUS at 08:30

## 2023-11-29 RX ADMIN — MIDAZOLAM 1 MG: 1 INJECTION INTRAMUSCULAR; INTRAVENOUS at 06:44

## 2023-11-29 RX ADMIN — DEXAMETHASONE SODIUM PHOSPHATE 4 MG: 4 INJECTION, SOLUTION INTRA-ARTICULAR; INTRALESIONAL; INTRAMUSCULAR; INTRAVENOUS; SOFT TISSUE at 06:45

## 2023-11-29 RX ADMIN — SUGAMMADEX 200 MG: 100 INJECTION, SOLUTION INTRAVENOUS at 08:33

## 2023-11-29 RX ADMIN — PHENYLEPHRINE HYDROCHLORIDE 100 MCG: 10 INJECTION, SOLUTION INTRAVENOUS at 08:30

## 2023-11-29 RX ADMIN — GLYCOPYRROLATE 0.2 MG: 1 INJECTION INTRAMUSCULAR; INTRAVENOUS at 07:06

## 2023-11-29 NOTE — ANESTHESIA PROCEDURE NOTES
Airway  Urgency: elective    Date/Time: 11/29/2023 7:08 AM  Airway not difficult    General Information and Staff    Patient location during procedure: OR  Anesthesiologist: Ant Montoya MD  CRNA/CAA: Elisa Chavez CRNA    Indications and Patient Condition  Indications for airway management: airway protection    Preoxygenated: yes  Mask difficulty assessment: 1 - vent by mask    Final Airway Details  Final airway type: endotracheal airway      Successful airway: ETT  Cuffed: yes   Successful intubation technique: video laryngoscopy  Facilitating devices/methods: intubating stylet  Endotracheal tube insertion site: oral  Blade: CMAC  Blade size: D  ETT size (mm): 7.0  Cormack-Lehane Classification: grade I - full view of glottis  Placement verified by: chest auscultation and capnometry   Measured from: lips  ETT/EBT  to lips (cm): 21  Number of attempts at approach: 1  Assessment: lips, teeth, and gum same as pre-op and atraumatic intubation

## 2023-11-29 NOTE — ANESTHESIA PROCEDURE NOTES
Interscalene nerve block      Patient reassessed immediately prior to procedure    Patient location during procedure: pre-op  Start time: 11/29/2023 6:45 AM  Stop time: 11/29/2023 6:50 AM  Reason for block: at surgeon's request and post-op pain management  Performed by  Anesthesiologist: Shanita Turner MD  Preanesthetic Checklist  Completed: patient identified, IV checked, site marked, risks and benefits discussed, surgical consent, monitors and equipment checked, pre-op evaluation and timeout performed  Prep:  Pt Position: sitting  Sterile barriers:cap, gloves and mask  Prep: ChloraPrep  Patient monitoring: blood pressure monitoring, continuous pulse oximetry and EKG  Procedure    Sedation: yes  Performed under: local infiltration  Guidance:ultrasound guided    ULTRASOUND INTERPRETATION.  Using ultrasound guidance a 22 G gauge needle was placed in close proximity to the brachial plexus nerve, at which point, under ultrasound guidance anesthetic was injected in the area of the nerve and spread of the anesthesia was seen on ultrasound in close proximity thereto.  There were no abnormalities seen on ultrasound; a digital image was taken; and the patient tolerated the procedure with no complications. Images:still images obtained, printed/placed on chart    Laterality:right  Block Type:interscalene  Injection Technique:single-shot  Needle Type:short-bevel and echogenic  Needle Gauge:22 G  Resistance on Injection: none    Medications Used: ropivacaine (NAROPIN) 0.5 % injection - Injection   20 mL - 11/29/2023 6:45:00 AM  dexamethasone (DECADRON) injection - Injection   4 mg - 11/29/2023 6:45:00 AM      Medications  Comment:Ultrasound Interpretation:  Using ultrasound guidance the needle was placed in close proximity to the target nerve and anesthetic was injected in the area of the target nerve and/or bundles, and spread of the anesthetic was seen on ultrasound in close proximity thereto.  There were no  abnormalities seen on ultrasound; a digital image was taken; and the patient tolerated the procedure with no complications.    Block placed for postoperative pain control per surgeon request.    Post Assessment  Injection Assessment: negative aspiration for heme, no paresthesia on injection and incremental injection  Patient Tolerance:comfortable throughout block  Complications:no

## 2023-11-29 NOTE — PLAN OF CARE
Goal Outcome Evaluation:  Plan of Care Reviewed With: significant other, patient           Outcome Evaluation: Pt is a 62 y/o F POD0 s/p R TSA. Hx of multiple prior shoulder sx. OT provided pt with education on shoulder px, sling mgt, UE dressing techniques, home safety and TSA HEP. She was found sitting EOB already dressed with assist from significant other. She completes HEP on non-surgical LUE d/t R still numb from nerve block and is instructed to complete on surgical arm once sensation has returned. Pt and family with good understanding of all education. All OT goals met at [facility], OT will sign off.      Anticipated Discharge Disposition (OT): home with assist, home with outpatient therapy services

## 2023-11-29 NOTE — THERAPY EVALUATION
"Patient Name: Mari Whitman  : 1960    MRN: [id number]                              Today's Date: 2023       Admit Date: 2023    Visit Dx: No diagnosis found.  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/o bleeding    Dysphagia    Enterocolitis due to Clostridium difficile    Functional dyspepsia    Gallstones    Gastro-esophageal reflux disease with esophagitis    Hemorrhoids without complication    History of colonic polyps    Rectal polyp    Pure hypercholesterolemia    Nausea and vomiting    Major depressive disorder, single episode, unspecified    Irritable bowel syndrome     Past Medical History:   Diagnosis Date    Bipolar disorder     Borderline diabetes     Cancer     cervix    Disease of thyroid gland     GERD (gastroesophageal reflux disease)     History of stomach ulcers     IBS (irritable bowel syndrome)     Idiopathic thrombocytopenic purpura (ITP)     Infected tooth     PT INSTRUCTED TO INFORM SURGEON    PALB2-related breast cancer     Pancreatic divisum     's papule     \"PICKERS WARTS\"    PONV (postoperative nausea and vomiting)     Sleep apnea      Past Surgical History:   Procedure Laterality Date    ANAL SPHINCTEROPLASTY      BACK SURGERY      CHOLECYSTECTOMY      COLONOSCOPY      ENDOSCOPY N/A 2020    Procedure: ESOPHAGOGASTRODUODENOSCOPY with biopsy x 2 areas;  Surgeon: Brad Bishop MD;  Location: [facility] ENDOSCOPY;  Service: Gastroenterology;  " Laterality: N/A;  Post operative diagnosis: gastritis    ERCP      HYSTERECTOMY      2000    NASAL SEPTUM SURGERY      ROTATOR CUFF REPAIR Right       General Information       Row Name 11/29/23 1242          OT Time and Intention    Document Type evaluation;discharge evaluation/summary  verbal orders for post op TSA per Wenceslao Thapa MD  -     Mode of Treatment occupational therapy  -       Row Name 11/29/23 1242          General Information    Patient Profile Reviewed yes  -JW     Prior Level of Function independent:;ADL's;all household mobility  -     Existing Precautions/Restrictions right;shoulder  -     Barriers to Rehab none identified  -       Row Name 11/29/23 1242          Living Environment    People in Home significant other  -[facility] Name 11/29/23 1242          Cognition    Orientation Status (Cognition) oriented x 3  -[facility] Name 11/29/23 1242          Safety Issues, Functional Mobility    Impairments Affecting Function (Mobility) range of motion (ROM);sensation/sensory awareness;strength  -               User Key  (r) = Recorded By, (t) = Taken By, (c) = Cosigned By      Initials Name Provider Type     Ryann Connell OT Occupational Therapist                     Mobility/ADL's       Row Name 11/29/23 1242          Activities of Daily Living    BADL Assessment/Intervention upper body dressing;lower body dressing  -[facility] Name 11/29/23 1242          Mobility    Extremity Weight-bearing Status right upper extremity  -     Right Upper Extremity (Weight-bearing Status) non weight-bearing (NWB)  -[facility] Name 11/29/23 [facility]          Upper Body Dressing Assessment/Training    Comment, (Upper Body Dressing) discussed UE ADLs using barrett dressing techs. Pt already dressed upon OT arrival  -[facility] Name 11/29/23 1242          Lower Body Dressing Assessment/Training    Comment, (Lower Body Dressing) discussed safety with LB dressing  -               User Key  (r) =  Recorded By, (t) = Taken By, (c) = Cosigned By      Initials Name Provider Type     Ryann Connell OT Occupational Therapist                   Obj/Interventions       [facility] Name 11/29/23 [facility]          Sensory Assessment (Somatosensory)    Sensory Assessment nerve block still intact on RUE  -[facility] Name 11/29/23 [facility]          Vision Assessment/Intervention    Visual Impairment/Limitations WNL  -JW       Row Name 11/29/23 [facility]          Range of Motion Comprehensive    Comment, General Range of Motion normal post-op dec AROM  -[facility] 11/29/23 [facility]          Strength Comprehensive (MMT)    Comment, General Manual Muscle Testing (MMT) Assessment normal post-op weakness  -JW       Row Name 11/29/23 [facility]          Shoulder (Therapeutic Exercise)    Shoulder (Therapeutic Exercise) pendulum exercises  -     Shoulder Pendulum Exercises (Therapeutic Exercise) 15 seconds duration  -JW       Row Name 11/29/23 [facility]          Elbow/Forearm (Therapeutic Exercise)    Elbow/Forearm (Therapeutic Exercise) AROM (active range of motion)  -     Elbow/Forearm AROM (Therapeutic Exercise) flexion;extension;supination;pronation;10 repetitions  -JW       Row Name 11/29/23 [facility]          Wrist (Therapeutic Exercise)    Wrist (Therapeutic Exercise) AROM (active range of motion)  -     Wrist AROM (Therapeutic Exercise) flexion;extension  -JW       Row Name 11/29/23 [facility]          Hand (Therapeutic Exercise)    Hand (Therapeutic Exercise) AROM (active range of motion)  -     Hand AROM/AAROM (Therapeutic Exercise) AROM (active range of motion);finger flexion;finger extension;finger aBduction;finger aDduction  -[facility] 11/29/23 [facility]          Motor Skills    Therapeutic Exercise shoulder;elbow/forearm;wrist;hand  completes TSA HEP on non-surgical LUE d/t R still numb from block. Instructed to complete on surgical arm once sensation has returned  -               User Key  (r) = Recorded By, (t) = Taken By, (c) = Cosigned  By      Initials Name Provider Type    Ryann Easley OT Occupational Therapist                   Goals/Plan       Row Name 11/29/23 1249          Problem Specific Goal 1 (OT)    Problem Specific Goal 1 (OT) Pt will be indep with TSA HEP and shoulder px  -     Time Frame (Problem Specific Goal 1, OT) short term goal (STG);2 weeks  -     Progress/Outcome (Problem Specific Goal 1, OT) goal met  -               User Key  (r) = Recorded By, (t) = Taken By, (c) = Cosigned By      Initials Name Provider Type    Ryann Easley OT Occupational Therapist                   Clinical Impression       Row Name 11/29/23 1245          Pain Assessment    Pretreatment Pain Rating 0/10 - no pain  -     Posttreatment Pain Rating 0/10 - no pain  -       Row Name 11/29/23 1249          Plan of Care Review    Plan of Care Reviewed With significant other;patient  -     Outcome Evaluation Pt is a 62 y/o F POD0 s/p R TSA. Hx of multiple prior shoulder sx. OT provided pt with education on shoulder px, sling mgt, UE dressing techniques, home safety and TSA HEP. She was found sitting EOB already dressed with assist from significant other. She completes HEP on non-surgical LUE d/t R still numb from nerve block and is instructed to complete on surgical arm once sensation has returned. Pt and family with good understanding of all education. All OT goals met at al, OT will sign off.  -       Row Name 11/29/23 1836          Therapy Assessment/Plan (OT)    Criteria for Skilled Therapeutic Interventions Met (OT) no problems identified which require skilled intervention  -     Therapy Frequency (OT) evaluation only  -[facility] Name 11/29/23 1243          Therapy Plan Review/Discharge Plan (OT)    Anticipated Discharge Disposition (OT) home with assist;home with outpatient therapy services  -[facility] Name 11/29/23 1249          Positioning and Restraints    Pre-Treatment Position in bed  -     Post Treatment Position bed   -JW     In Bed notified nsg;sitting EOB;call light within reach;encouraged to call for assist;with family/caregiver  -DANIAL               User Key  (r) = Recorded By, (t) = Taken By, (c) = Cosigned By      Initials Name Provider Type    Ryann Easley OT Occupational Therapist                   Outcome Measures       Row Name 11/29/23 1249          How much help from another is currently needed...    Putting on and taking off regular lower body clothing? 3  -JW     Bathing (including washing, rinsing, and drying) 3  -JW     Toileting (which includes using toilet bed pan or urinal) 3  -JW     Putting on and taking off regular upper body clothing 3  -JW     Taking care of personal grooming (such as brushing teeth) 3  -JW     Eating meals 3  -JW     AM-PAC 6 Clicks Score (OT) 18  -JW       Row Name 11/29/23 1249          Functional Assessment    Outcome Measure Options AM-PAC 6 Clicks Daily Activity (OT)  -DANIAL               User Key  (r) = Recorded By, (t) = Taken By, (c) = Cosigned By      Initials Name Provider Type    Ryann Easley OT Occupational Therapist                    Occupational Therapy Education       Title: PT OT SLP Therapies (Done)       Topic: Occupational Therapy (Done)       Point: ADL training (Done)       Description:   Instruct learner(s) on proper safety adaptation and remediation techniques during self care or transfers.   Instruct in proper use of assistive devices.                  Learning Progress Summary             Patient Acceptance, E,D,H, VU by DANIAL at 11/29/2023 1250   Family Acceptance, E,D,H, VU by DANIAL at 11/29/2023 1250                         Point: Home exercise program (Done)       Description:   Instruct learner(s) on appropriate technique for monitoring, assisting and/or progressing therapeutic exercises/activities.                  Learning Progress Summary             Patient Acceptance, E,D,H, VU by DANIAL at 11/29/2023 1250   Family Acceptance, E,D,H, VU by DANIAL at 11/29/2023 1250                          Point: Precautions (Done)       Description:   Instruct learner(s) on prescribed precautions during self-care and functional transfers.                  Learning Progress Summary             Patient Acceptance, E,D,H, VU by DANIAL at 11/29/2023 1250   Family Acceptance, E,D,H, VU by  at 11/29/2023 1250                         Point: Body mechanics (Done)       Description:   Instruct learner(s) on proper positioning and spine alignment during self-care, functional mobility activities and/or exercises.                  Learning Progress Summary             Patient Acceptance, E,D,H, VU by DANIAL at 11/29/2023 1250   Family Acceptance, E,D,H, VU by DANIAL at 11/29/2023 1250                                         User Key       Initials Effective Dates Name Provider Type Discipline     06/10/21 -  Ryann Connell OT Occupational Therapist OT                  OT Recommendation and Plan  Therapy Frequency (OT): evaluation only  Plan of Care Review  Plan of Care Reviewed With: significant other, patient  Outcome Evaluation: Pt is a 62 y/o F POD0 s/p R TSA. Hx of multiple prior shoulder sx. OT provided pt with education on shoulder px, sling mgt, UE dressing techniques, home safety and TSA HEP. She was found sitting EOB already dressed with assist from significant other. She completes HEP on non-surgical LUE d/t R still numb from nerve block and is instructed to complete on surgical arm once sensation has returned. Pt and family with good understanding of all education. All OT goals met at eval, OT will sign off.     Time Calculation:   Evaluation Complexity (OT)  Review Occupational Profile/Medical/Therapy History Complexity: brief/low complexity  Assessment, Occupational Performance/Identification of Deficit Complexity: 1-3 performance deficits  Clinical Decision Making Complexity (OT): problem focused assessment/low complexity  Overall Complexity of Evaluation (OT): low complexity     Time Calculation- OT        Row Name 11/29/23 1250             Time Calculation- OT    OT Start Time 1115  -JW      OT Stop Time 1130  -JW      OT Time Calculation (min) 15 min  -JW      Total Timed Code Minutes- OT 8 minute(s)  -JW      OT Received On 11/29/23  -JW         Timed Charges    56606 - OT Therapeutic Exercise Minutes 8  -JW         Untimed Charges    OT Eval/Re-eval Minutes 7  -JW         Total Minutes    Timed Charges Total Minutes 8  -JW      Untimed Charges Total Minutes 7  -JW       Total Minutes 15  -JW                User Key  (r) = Recorded By, (t) = Taken By, (c) = Cosigned By      Initials Name Provider Type    Ryann Easley OT Occupational Therapist                  Therapy Charges for Today       Code Description Service Date Service Provider Modifiers Qty    87670328415  OT THER PROC EA 15 MIN 11/29/2023 Ryann Connell OT GO 1    [id number]  OT EVAL LOW COMPLEXITY 2 11/29/2023 Ryann Connell OT GO 1                 Ryann Connell OT  11/29/2023

## 2023-11-29 NOTE — ANESTHESIA POSTPROCEDURE EVALUATION
Patient: Mari Whitman    Procedure Summary       Date: 11/29/23 Room / Location:  [facility] [address] [place] [address] [facility]    Anesthesia Start: 0700 Anesthesia Stop: 0857    Procedure: TOTAL SHOULDER ARTHROPLASTY (Right: Shoulder) Diagnosis:     Surgeons: Wenceslao Thapa MD Provider: Ant Montoya MD    Anesthesia Type: general ASA Status: 2            Anesthesia Type: general    Vitals  Vitals Value Taken Time   /61 11/29/23 1015   Temp     Pulse 79 11/29/23 1019   Resp 16 11/29/23 1000   SpO2 95 % 11/29/23 1019   Vitals shown include unfiled device data.        Post Anesthesia Care and Evaluation    Patient location during evaluation: bedside  Patient participation: complete - patient participated  Level of consciousness: awake and alert  Pain management: adequate    Airway patency: patent  Anesthetic complications: No anesthetic complications  PONV Status: controlled  Cardiovascular status: acceptable  Respiratory status: acceptable  Hydration status: acceptable

## 2023-11-29 NOTE — OP NOTE
TOTAL SHOULDER ARTHROPLASTY  Procedure Note    Mari Whitman  11/29/2023    Pre-op Diagnosis:   1.  Right glenohumeral osteoarthritis  2.   3.     Post-op Diagnosis:     1.  Same  2.  3.     Procedure(s):  1.  Right total shoulder arthroplasty  2.   3.   4.     Surgeon(s):  Wenceslao Thapa MD    Anesthesia: General with Block  Anesthesiologist: Ant Montoya MD  CRNA: Elisa Chavez CRNA    Staff:   Circulator: Tana Tobias RN  Scrub Person: Kathia Martinez  Vendor Representative: Aung Rahman  Assistant: Parrish Núñez PA-C      Drains: None    Estimated Blood Loss: minimal    Specimen: * No orders in the log *    Description of Procedure: Following induction of anesthesia the patient was placed in the beachchair position.  Right shoulder was then prepped and draped in a sterile fashion.  I marked the skin for deltopectoral approach and cover the skin with Ioban.  I incised beginning just proximal and lateral to the coracoid process toward the axillary fold.  Subcutaneous tissue was bluntly dissected exposing the deltopectoral interval.  The deltoid and cephalic vein were retracted laterally while the pectoralis was retracted medially.  The clavipectoral fascia was incised and the strap muscles were retracted medially.  She was found to have a very healthy appearing subscapularis.  The biceps tendon was identified in the bicipital groove and tagged with a 0 Vicryl suture.  I ligated the inferior feeder vessels then performed a careful tenotomy leaving around a centimeter and a half attached to the lesser tuberosity.  I incised through the tendon and the rotator interval using the cautery.  A max braid suture was used to tag the superior corner of the subscapularis.  I was then able to rotate the articular surface into the operative field.  The cutting guide for the Bi[employer] comprehensive system was pinned against the anterior cortex and I made the humeral head cut with oscillating saw.  We then  prepared the proximal humeral canal beginning with a T-handle awls and finishing with a size 11 broach.  The size 11 gave good fit of the surrounding bone.    I then placed 2 posterior and 1 anterior glenoid retractor.  She was found to have some mild central wear of the glenoid but no significant deformity.  I excised the intra-articular portion of the biceps tendon and excised the capsule superiorly anteriorly and anteriorly inferiorly.  I then placed the guide for the size to glenoid component into the central portion of her glenoid.  The Steinmann pin was then placed through the guide to its appropriate depth.  I then overreamed for the central peg of the removed our Steinmann pin.  I then placed the 3-hole guide into our peg hole and drilled the small holes for the 3 cemented pegs.  I then irrigated and placed the trial #2 glenoid component and impacted into place.  It gave good fit of the surrounding bone.  I then removed the trial glenoid and irrigated and dried the drill holes.  A small amount of cement was placed on the back of the glenoid component on the back table and then was impacted into place.  Excess cement was removed with a small elevator.  I then held pressure on the glenoid component while the cement cured.  I then performed a trial and found that the size 42 X21 X43 head and the C offset position gave good coverage of our humeral surface and was stable when the shoulder was reduced.  It also allowed repair of the subscapularis without any undue tension.    I then removed the trial stem from the proximal humerus.  Proximal humerus then irrigated and dried and I impacted the size 11 mini comprehensive stem into place.  I repeated our trial again and found that the 42 x 21 x 43 head gave good stability.  I then removed the trial head, dried the trunnion of the humeral stem then impacted our humeral head component into place.  Shoulder was then reduced and extensively irrigated.  I repaired the  subscapularis with the #2 max braid suture in a figure-of-eight fashion x3.  I then closed the rotator interval with the same max braid with figure-of-eight suture x2.  We achieved good repair of the rotator cuff without any undue tension.  We then irrigated then performed closure using 0 Vicryl in a running fashion to close the deltopectoral interval.  Subcuticular tissue was then closed using a 2-0 strata fix and skin was closed using a Zipline.  She was then placed into a soft sterile dressing in her postop sling.  She will be discharged to recovery then hopefully home.  Her prognosis is good.    Parrish Núñez PA-C assisted me in this procedure.  His presence was necessary to help with holding the patient's limb and the retractors.  He allowed me to perform the procedure in a much quicker fashion resulting in less morbidity and risk for the patient.  An extra set of skilled sugical hands was necessary to effectively perform this procedure.        Findings: See Dictation    Complications: None      Wenceslao Thapa MD     Date: 11/29/2023  Time: 08:35 EST

## 2023-12-04 ENCOUNTER — TELEPHONE (OUTPATIENT)
Dept: ORTHOPEDIC SURGERY | Facility: HOSPITAL | Age: 63
End: 2023-12-04
Payer: COMMERCIAL

## 2023-12-04 NOTE — TELEPHONE ENCOUNTER
Post op day 5  Discharge Instructions:  Ask patient about his or her discharge instructions  ?  Patient confirmed understanding   []  Further instruction needed   What, if any, recommendations, teaching, or interventions did you provide? Click or tap here to enter text.  Health status:  Pain controlled Yes   Taking Tylenol during the day and the pain medication at HS.   Recommended interventions:  Yes  incision/dressing status   ?  Clean without redness, drainage, odor  []  Redness    []  Drainage - color Click or tap here to enter text.  []  Odor  RUSSEL - Green light blinking Choose an item.  Difficulties urination No  Last BM 12/4/2023 (if no BM by day 3-recommend OTC suppository or fleets enema)  No issues   Medications:  ?Medications reviewed with patient/family/caregiver  Patient taking medications as prescribed?   Yes  If not taking medications as prescribed, note specific medicine(s) and reason for each:  Click or tap here to enter text.  Hospital Follow Up Plan:  Follow up Appointment with Orthopedic surgeon:  ?Has f/u appointment                []Scheduled f/u appointment  Home Care ordered at discharge?    No        Home Care started, or contact made?    No   If no, action taken: Total Shoulder   DME obtained/used in home?         Yes   Using IS  Choose an item.   Other information: Ms. Whitman said she is doing fine. She has some swelling and bruising. She took her arm out of the sling and propped it on a pillow. Said it feels better now. She is taking the pain medication at HS and doing Tylenol during the day. She is doing her exercises. She hasn't taken a shower yet and was wondering when she would be able to do so. According to her D/C instructions told her she could shower POD 2. She said she may try to wash her hair tomorrow. Ms. Whitman doesn't have any questions for me at this time. She has my contact information should she need anything.

## 2023-12-21 ENCOUNTER — TELEPHONE (OUTPATIENT)
Dept: ORTHOPEDIC SURGERY | Facility: HOSPITAL | Age: 63
End: 2023-12-21
Payer: COMMERCIAL

## 2023-12-21 NOTE — TELEPHONE ENCOUNTER
Attempted to reach Ms. Whitman to see how she has been doing since her RTS 11/29. Message left at this time.

## 2023-12-21 NOTE — TELEPHONE ENCOUNTER
Ms. Whitman called me back at this time. She said she is doing ok for the most part. She is still having a lot of pain at night. She said she can tolerate Tylenol during the day, but at night the pain is severe. She is propping her arm up, she is using ice and it doesn't help. She has been out of pain medication for a few days. She said she has called the office and her pharmacy sent a refill request but she still hasn't heard anything. She is working with PT and doing her stretching exercises and they have been using a tens unit as well. Her scar is tender and raised and when she lays down, it feels like it's going to rip open. She has seen the MD and they did an XR. Everything looks good. She said there isn't anything that she wants me to do, as she feels she has done everything, it's just a waiting game now. Ms. Whitman doesn't have any questions for me at this time. She has my contact information should she need anything.

## 2024-05-06 ENCOUNTER — PATIENT ROUNDING (BHMG ONLY) (OUTPATIENT)
Dept: CARDIOLOGY | Facility: CLINIC | Age: 64
End: 2024-05-06

## 2024-05-06 ENCOUNTER — OFFICE VISIT (OUTPATIENT)
Dept: CARDIOLOGY | Facility: CLINIC | Age: 64
End: 2024-05-06
Payer: COMMERCIAL

## 2024-05-06 VITALS
HEART RATE: 82 BPM | DIASTOLIC BLOOD PRESSURE: 83 MMHG | WEIGHT: 191 LBS | BODY MASS INDEX: 31.82 KG/M2 | SYSTOLIC BLOOD PRESSURE: 137 MMHG | HEIGHT: 65 IN | OXYGEN SATURATION: 100 %

## 2024-05-06 DIAGNOSIS — E11.9 TYPE 2 DIABETES MELLITUS WITHOUT COMPLICATION, WITHOUT LONG-TERM CURRENT USE OF INSULIN: ICD-10-CM

## 2024-05-06 DIAGNOSIS — G47.33 OSA (OBSTRUCTIVE SLEEP APNEA): ICD-10-CM

## 2024-05-06 DIAGNOSIS — E78.5 DYSLIPIDEMIA: ICD-10-CM

## 2024-05-06 DIAGNOSIS — E66.9 OBESITY (BMI 30-39.9): ICD-10-CM

## 2024-05-06 DIAGNOSIS — R06.09 DYSPNEA ON EXERTION: Primary | ICD-10-CM

## 2024-05-06 PROCEDURE — 93000 ELECTROCARDIOGRAM COMPLETE: CPT | Performed by: INTERNAL MEDICINE

## 2024-05-06 PROCEDURE — 99204 OFFICE O/P NEW MOD 45 MIN: CPT | Performed by: INTERNAL MEDICINE

## 2024-05-06 RX ORDER — ESTRADIOL 0.5 MG/1
0.5 TABLET ORAL DAILY
COMMUNITY
Start: 2024-04-19

## 2024-05-06 RX ORDER — CLONAZEPAM 1 MG/1
1 TABLET ORAL 2 TIMES DAILY
COMMUNITY
Start: 2024-03-25

## 2024-05-06 RX ORDER — PANCRELIPASE 36000; 180000; 114000 [USP'U]/1; [USP'U]/1; [USP'U]/1
CAPSULE, DELAYED RELEASE PELLETS ORAL
COMMUNITY
Start: 2024-04-16

## 2024-05-13 ENCOUNTER — OFFICE VISIT (OUTPATIENT)
Dept: MAMMOGRAPHY | Facility: CLINIC | Age: 64
End: 2024-05-13
Payer: COMMERCIAL

## 2024-05-13 VITALS
OXYGEN SATURATION: 98 % | SYSTOLIC BLOOD PRESSURE: 136 MMHG | DIASTOLIC BLOOD PRESSURE: 84 MMHG | HEIGHT: 66 IN | BODY MASS INDEX: 29.73 KG/M2 | HEART RATE: 84 BPM | WEIGHT: 185 LBS

## 2024-05-13 DIAGNOSIS — Z12.39 SCREENING BREAST EXAMINATION: Primary | ICD-10-CM

## 2024-05-13 PROCEDURE — 99213 OFFICE O/P EST LOW 20 MIN: CPT | Performed by: SURGERY

## 2024-05-13 NOTE — LETTER
"May 13, 2024     Margarita Newby MD  [address] [facility] [address]    Patient: Mari Whitman   YOB: 1960   Date of Visit: 5/13/2024     Dear Margarita Newby MD:       Thank you for referring Mari Whitman to me for evaluation. Below are the relevant portions of my assessment and plan of care.    If you have questions, please do not hesitate to call me. I look forward to following Mari along with you.         Sincerely,        Malachi Prieto MD        CC: MD Ida Negron, Malachi AIKEN MD  05/13/24 1646  Sign when Signing Visit  Subjective   Mari Whitman is a 63 y.o. female     History of Present Illness   She is a nice 63-year-old white female who carries a PAL B2 mutation.  She does have elevated breast cancer risk as a result of this but there has been very little breast cancer in the family.  The patient has no new cases of breast cancer since we last met.  She is following closely with the doctors at the [facility] in regards to pancreatic surveillance.    She has not had mammogram since March 2023 which did not reveal any suspicious abnormalities.  The patient has not opted to have additional screening with MRI.    The patient has lost about 10 pounds after going on a new diet.  Her cardiologist is also encouraged her to walk at least 45 minutes a day and she is doing all of that.      Review of Systems constitutional-the patient has lost about 10 pounds on purpose.  Past Medical History:   Diagnosis Date   • Bipolar disorder    • Borderline diabetes    • Cancer     cervix   • Disease of thyroid gland    • GERD (gastroesophageal reflux disease)    • History of stomach ulcers    • IBS (irritable bowel syndrome)    • Idiopathic thrombocytopenic purpura (ITP)    • Infected tooth     PT INSTRUCTED TO INFORM SURGEON   • PALB2-related breast cancer    • Pancreatic divisum    • 's papule     \"PICKERS WARTS\"   • PONV (postoperative nausea and vomiting)    • " Sleep apnea      Past Surgical History:   Procedure Laterality Date   • ANAL SPHINCTEROPLASTY     • BACK SURGERY     • CHOLECYSTECTOMY     • COLONOSCOPY     • ENDOSCOPY N/A 2020    Procedure: ESOPHAGOGASTRODUODENOSCOPY with biopsy x 2 areas;  Surgeon: Brad Bishop MD;  Location:  [facility];  Service: Gastroenterology;  Laterality: N/A;  Post operative diagnosis: gastritis   • ERCP     • HYSTERECTOMY         • NASAL SEPTUM SURGERY     • ROTATOR CUFF REPAIR Right    • TOTAL SHOULDER ARTHROPLASTY Right 2023    Procedure: TOTAL SHOULDER ARTHROPLASTY;  Surgeon: Wenceslao Thapa MD;  Location:  [facility];  Service: Orthopedics;  Laterality: Right;     Family History   Problem Relation Age of Onset   • Malig Hyperthermia Neg Hx      Social History     Socioeconomic History   • Marital status: Single   Tobacco Use   • Smoking status: Former     Current packs/day: 0.00     Types: Cigarettes     Quit date:      Years since quittin.3     Passive exposure: Past   • Smokeless tobacco: Never   Vaping Use   • Vaping status: Never Used   Substance and Sexual Activity   • Alcohol use: Not Currently   • Drug use: Never   • Sexual activity: Defer       Objective   Physical Exam  BMI is >= 25 and <30. (Overweight) The following options were offered after discussion;: weight loss educational material (shared in after visit summary)  Right breast-medium size and symmetrical.  I do not see any changes to the skin of the breast and there was no nipple discharge.  I did not see any skin dimpling or nipple retraction when the arms were raised and the pectoralis muscle was contracted.  There were no worrisome palpable masses.  Left breast-medium size and symmetrical without changes to the skin or nipple discharge.  There was no skin dimpling or nipple retraction when the arms were raised and the pectoralis muscle was contracted.  I did not palpate any masses in the breast that were  concerning.  Lymphatics-no cervical or axillary adenopathy.    Assessment & Plan   At high risk for breast cancer with a PAL B2 mutation.  The patient is a little behind on her mammogram and I placed orders for 1 to be performed in the very near future.  I will call her with those results.  If everything looks good, my plans are to see her back in the office in a year.  The increased BMI is being addressed with the patient increasing her walking.  She in fact has lost 10 pounds.    There were no encounter diagnoses.

## 2024-05-13 NOTE — PROGRESS NOTES
"Subjective   Mari Whitman is a 63 y.o. female     History of Present Illness   She is a nice 63-year-old white female who carries a PAL B2 mutation.  She does have elevated breast cancer risk as a result of this but there has been very little breast cancer in the family.  The patient has no new cases of breast cancer since we last met.  She is following closely with the doctors at the [facility] in regards to pancreatic surveillance.    She has not had mammogram since March 2023 which did not reveal any suspicious abnormalities.  The patient has not opted to have additional screening with MRI.    The patient has lost about 10 pounds after going on a new diet.  Her cardiologist is also encouraged her to walk at least 45 minutes a day and she is doing all of that.      Review of Systems constitutional-the patient has lost about 10 pounds on purpose.  Past Medical History:   Diagnosis Date    Bipolar disorder     Borderline diabetes     Cancer     cervix    Disease of thyroid gland     GERD (gastroesophageal reflux disease)     History of stomach ulcers     IBS (irritable bowel syndrome)     Idiopathic thrombocytopenic purpura (ITP)     Infected tooth     PT INSTRUCTED TO INFORM SURGEON    PALB2-related breast cancer     Pancreatic divisum     's papule     \"PICKERS WARTS\"    PONV (postoperative nausea and vomiting)     Sleep apnea      Past Surgical History:   Procedure Laterality Date    ANAL SPHINCTEROPLASTY      BACK SURGERY      CHOLECYSTECTOMY      COLONOSCOPY      ENDOSCOPY N/A 05/18/2020    Procedure: ESOPHAGOGASTRODUODENOSCOPY with biopsy x 2 areas;  Surgeon: Brad Bishop MD;  Location: [facility];  Service: Gastroenterology;  Laterality: N/A;  Post operative diagnosis: gastritis    ERCP      HYSTERECTOMY      2000    NASAL SEPTUM SURGERY      ROTATOR CUFF REPAIR Right     TOTAL SHOULDER ARTHROPLASTY Right 11/29/2023    Procedure: TOTAL SHOULDER ARTHROPLASTY;  Surgeon: " Wenceslao Thapa MD;  [place] [facility] [place] [facility]  Service: Orthopedics;  Laterality: Right;     Family History   Problem Relation Age of Onset    Malig Hyperthermia Neg Hx      Social History     Socioeconomic History    Marital status: Single   Tobacco Use    Smoking status: Former     Current packs/day: 0.00     Types: Cigarettes     Quit date:      Years since quittin.3     Passive exposure: Past    Smokeless tobacco: Never   Vaping Use    Vaping status: Never Used   Substance and Sexual Activity    Alcohol use: Not Currently    Drug use: Never    Sexual activity: Defer       Objective   Physical Exam  BMI is >= 25 and <30. (Overweight) The following options were offered after discussion;: weight loss educational material (shared in after visit summary)  Right breast-medium size and symmetrical.  I do not see any changes to the skin of the breast and there was no nipple discharge.  I did not see any skin dimpling or nipple retraction when the arms were raised and the pectoralis muscle was contracted.  There were no worrisome palpable masses.  Left breast-medium size and symmetrical without changes to the skin or nipple discharge.  There was no skin dimpling or nipple retraction when the arms were raised and the pectoralis muscle was contracted.  I did not palpate any masses in the breast that were concerning.  Lymphatics-no cervical or axillary adenopathy.    Assessment & Plan   At high risk for breast cancer with a PAL B2 mutation.  The patient is a little behind on her mammogram and I placed orders for 1 to be performed in the very near future.  I will call her with those results.  If everything looks good, my plans are to see her back in the office in a year.  The increased BMI is being addressed with the patient increasing her walking.  She in fact has lost 10 pounds.    There were no encounter diagnoses.

## 2024-05-24 ENCOUNTER — APPOINTMENT (OUTPATIENT)
Dept: WOMENS IMAGING | Facility: HOSPITAL | Age: 64
End: 2024-05-24
Payer: COMMERCIAL

## 2024-05-24 PROCEDURE — 77063 BREAST TOMOSYNTHESIS BI: CPT | Performed by: RADIOLOGY

## 2024-05-24 PROCEDURE — 77067 SCR MAMMO BI INCL CAD: CPT | Performed by: RADIOLOGY

## 2024-05-29 ENCOUNTER — DOCUMENTATION (OUTPATIENT)
Dept: MAMMOGRAPHY | Facility: CLINIC | Age: 64
End: 2024-05-29
Payer: COMMERCIAL

## 2024-05-29 NOTE — PROGRESS NOTES
I left a message stating that the recent MRI looked good.  Her next office visit is May of next year.  I asked her to call me if she had any questions.

## 2024-06-20 ENCOUNTER — OFFICE (AMBULATORY)
Dept: URBAN - METROPOLITAN AREA CLINIC 64 | Facility: CLINIC | Age: 64
End: 2024-06-20

## 2024-06-20 VITALS
DIASTOLIC BLOOD PRESSURE: 85 MMHG | WEIGHT: 186 LBS | HEIGHT: 66 IN | SYSTOLIC BLOOD PRESSURE: 140 MMHG | HEART RATE: 88 BPM

## 2024-06-20 DIAGNOSIS — K86.1 OTHER CHRONIC PANCREATITIS: ICD-10-CM

## 2024-06-20 DIAGNOSIS — R13.10 DYSPHAGIA, UNSPECIFIED: ICD-10-CM

## 2024-06-20 PROCEDURE — 99214 OFFICE O/P EST MOD 30 MIN: CPT | Performed by: INTERNAL MEDICINE

## 2024-07-12 ENCOUNTER — HOSPITAL ENCOUNTER (OUTPATIENT)
Dept: CT IMAGING | Facility: HOSPITAL | Age: 64
Discharge: HOME | End: 2024-07-12

## 2024-07-12 ENCOUNTER — HOSPITAL ENCOUNTER (OUTPATIENT)
Dept: CARDIOLOGY | Facility: HOSPITAL | Age: 64
Discharge: HOME | End: 2024-07-12

## 2024-07-12 DIAGNOSIS — G47.33 OSA (OBSTRUCTIVE SLEEP APNEA): ICD-10-CM

## 2024-07-12 DIAGNOSIS — E11.9 TYPE 2 DIABETES MELLITUS WITHOUT COMPLICATION, WITHOUT LONG-TERM CURRENT USE OF INSULIN: ICD-10-CM

## 2024-07-12 DIAGNOSIS — E78.5 DYSLIPIDEMIA: ICD-10-CM

## 2024-07-12 DIAGNOSIS — E66.9 OBESITY (BMI 30-39.9): ICD-10-CM

## 2024-07-12 DIAGNOSIS — R06.09 DYSPNEA ON EXERTION: ICD-10-CM

## 2024-07-12 LAB
BH CV VAS SCREENING CAROTID CCA LEFT: 88 CM/SEC
BH CV VAS SCREENING CAROTID CCA RIGHT: 69 CM/SEC
BH CV VAS SCREENING CAROTID ICA LEFT: 74 CM/SEC
BH CV VAS SCREENING CAROTID ICA RIGHT: 94 CM/SEC
BH CV XLRA MEAS - MID AO DIAM: 1.4 CM
BH CV XLRA MEAS - PAD LEFT ABI PT: 1.17
BH CV XLRA MEAS - PAD LEFT ARM: 119 MMHG
BH CV XLRA MEAS - PAD LEFT LEG PT: 139 MMHG
BH CV XLRA MEAS - PAD RIGHT ABI PT: 1.1
BH CV XLRA MEAS - PAD RIGHT ARM: 116 MMHG
BH CV XLRA MEAS - PAD RIGHT LEG PT: 131 MMHG
BH CV XLRA MEAS LEFT DIST CCA EDV: -23 CM/SEC
BH CV XLRA MEAS LEFT DIST CCA PSV: -87.6 CM/SEC
BH CV XLRA MEAS LEFT ICA/CCA RATIO: 0.8
BH CV XLRA MEAS LEFT PROX ICA EDV: -24.9 CM/SEC
BH CV XLRA MEAS LEFT PROX ICA PSV: -73.9 CM/SEC
BH CV XLRA MEAS RIGHT DIST CCA EDV: -15.7 CM/SEC
BH CV XLRA MEAS RIGHT DIST CCA PSV: -68.8 CM/SEC
BH CV XLRA MEAS RIGHT ICA/CCA RATIO: 1.4
BH CV XLRA MEAS RIGHT PROX ICA EDV: -26.7 CM/SEC
BH CV XLRA MEAS RIGHT PROX ICA PSV: -94.4 CM/SEC

## 2024-07-12 PROCEDURE — 75571 CT HRT W/O DYE W/CA TEST: CPT

## 2024-07-12 PROCEDURE — 93799 UNLISTED CV SVC/PROCEDURE: CPT

## 2024-07-15 ENCOUNTER — PATIENT MESSAGE (OUTPATIENT)
Dept: CARDIOLOGY | Facility: CLINIC | Age: 64
End: 2024-07-15
Payer: COMMERCIAL

## 2024-09-17 ENCOUNTER — OFFICE (AMBULATORY)
Age: 64
End: 2024-09-17

## 2024-09-17 ENCOUNTER — ON CAMPUS - OUTPATIENT (AMBULATORY)
Age: 64
End: 2024-09-17

## 2024-09-17 ENCOUNTER — ON CAMPUS - OUTPATIENT (AMBULATORY)
Dept: URBAN - METROPOLITAN AREA HOSPITAL 2 | Facility: HOSPITAL | Age: 64
End: 2024-09-17

## 2024-09-17 ENCOUNTER — OFFICE (AMBULATORY)
Dept: URBAN - METROPOLITAN AREA PATHOLOGY 19 | Facility: PATHOLOGY | Age: 64
End: 2024-09-17

## 2024-09-17 VITALS
RESPIRATION RATE: 17 BRPM | SYSTOLIC BLOOD PRESSURE: 138 MMHG | OXYGEN SATURATION: 98 % | WEIGHT: 188 LBS | HEART RATE: 79 BPM | SYSTOLIC BLOOD PRESSURE: 125 MMHG | RESPIRATION RATE: 16 BRPM | DIASTOLIC BLOOD PRESSURE: 76 MMHG | HEART RATE: 75 BPM | RESPIRATION RATE: 17 BRPM | HEART RATE: 87 BPM | RESPIRATION RATE: 17 BRPM | DIASTOLIC BLOOD PRESSURE: 74 MMHG | SYSTOLIC BLOOD PRESSURE: 132 MMHG | HEART RATE: 75 BPM | DIASTOLIC BLOOD PRESSURE: 76 MMHG | DIASTOLIC BLOOD PRESSURE: 80 MMHG | SYSTOLIC BLOOD PRESSURE: 114 MMHG | OXYGEN SATURATION: 95 % | HEART RATE: 75 BPM | HEART RATE: 72 BPM | DIASTOLIC BLOOD PRESSURE: 69 MMHG | SYSTOLIC BLOOD PRESSURE: 125 MMHG | OXYGEN SATURATION: 98 % | DIASTOLIC BLOOD PRESSURE: 80 MMHG | HEIGHT: 66 IN | TEMPERATURE: 96.8 F | TEMPERATURE: 96.8 F | HEIGHT: 66 IN | HEART RATE: 79 BPM | SYSTOLIC BLOOD PRESSURE: 111 MMHG | SYSTOLIC BLOOD PRESSURE: 138 MMHG | RESPIRATION RATE: 15 BRPM | HEART RATE: 76 BPM | SYSTOLIC BLOOD PRESSURE: 138 MMHG | OXYGEN SATURATION: 96 % | OXYGEN SATURATION: 95 % | SYSTOLIC BLOOD PRESSURE: 132 MMHG | RESPIRATION RATE: 15 BRPM | DIASTOLIC BLOOD PRESSURE: 77 MMHG | WEIGHT: 188 LBS | OXYGEN SATURATION: 100 % | HEART RATE: 72 BPM | RESPIRATION RATE: 17 BRPM | DIASTOLIC BLOOD PRESSURE: 76 MMHG | RESPIRATION RATE: 15 BRPM | DIASTOLIC BLOOD PRESSURE: 80 MMHG | HEART RATE: 76 BPM | HEART RATE: 90 BPM | DIASTOLIC BLOOD PRESSURE: 69 MMHG | HEART RATE: 87 BPM | RESPIRATION RATE: 17 BRPM | SYSTOLIC BLOOD PRESSURE: 132 MMHG | SYSTOLIC BLOOD PRESSURE: 111 MMHG | HEART RATE: 72 BPM | SYSTOLIC BLOOD PRESSURE: 111 MMHG | OXYGEN SATURATION: 95 % | DIASTOLIC BLOOD PRESSURE: 69 MMHG | SYSTOLIC BLOOD PRESSURE: 125 MMHG | OXYGEN SATURATION: 100 % | HEART RATE: 76 BPM | SYSTOLIC BLOOD PRESSURE: 125 MMHG | HEART RATE: 76 BPM | RESPIRATION RATE: 16 BRPM | RESPIRATION RATE: 15 BRPM | OXYGEN SATURATION: 96 % | HEIGHT: 66 IN | DIASTOLIC BLOOD PRESSURE: 69 MMHG | SYSTOLIC BLOOD PRESSURE: 125 MMHG | DIASTOLIC BLOOD PRESSURE: 85 MMHG | TEMPERATURE: 96.8 F | SYSTOLIC BLOOD PRESSURE: 138 MMHG | RESPIRATION RATE: 17 BRPM | SYSTOLIC BLOOD PRESSURE: 114 MMHG | DIASTOLIC BLOOD PRESSURE: 76 MMHG | SYSTOLIC BLOOD PRESSURE: 114 MMHG | OXYGEN SATURATION: 98 % | SYSTOLIC BLOOD PRESSURE: 114 MMHG | HEART RATE: 90 BPM | DIASTOLIC BLOOD PRESSURE: 80 MMHG | DIASTOLIC BLOOD PRESSURE: 69 MMHG | WEIGHT: 188 LBS | DIASTOLIC BLOOD PRESSURE: 85 MMHG | SYSTOLIC BLOOD PRESSURE: 125 MMHG | HEART RATE: 75 BPM | DIASTOLIC BLOOD PRESSURE: 85 MMHG | RESPIRATION RATE: 15 BRPM | DIASTOLIC BLOOD PRESSURE: 85 MMHG | DIASTOLIC BLOOD PRESSURE: 80 MMHG | TEMPERATURE: 96.8 F | HEIGHT: 66 IN | RESPIRATION RATE: 17 BRPM | HEART RATE: 76 BPM | HEART RATE: 87 BPM | SYSTOLIC BLOOD PRESSURE: 111 MMHG | HEART RATE: 72 BPM | HEART RATE: 76 BPM | HEART RATE: 79 BPM | DIASTOLIC BLOOD PRESSURE: 85 MMHG | HEART RATE: 72 BPM | SYSTOLIC BLOOD PRESSURE: 111 MMHG | RESPIRATION RATE: 18 BRPM | DIASTOLIC BLOOD PRESSURE: 76 MMHG | SYSTOLIC BLOOD PRESSURE: 114 MMHG | SYSTOLIC BLOOD PRESSURE: 138 MMHG | HEART RATE: 75 BPM | DIASTOLIC BLOOD PRESSURE: 77 MMHG | DIASTOLIC BLOOD PRESSURE: 85 MMHG | HEART RATE: 72 BPM | DIASTOLIC BLOOD PRESSURE: 74 MMHG | HEART RATE: 79 BPM | RESPIRATION RATE: 18 BRPM | SYSTOLIC BLOOD PRESSURE: 132 MMHG | OXYGEN SATURATION: 98 % | OXYGEN SATURATION: 95 % | SYSTOLIC BLOOD PRESSURE: 138 MMHG | SYSTOLIC BLOOD PRESSURE: 138 MMHG | OXYGEN SATURATION: 96 % | WEIGHT: 188 LBS | TEMPERATURE: 96.8 F | OXYGEN SATURATION: 100 % | OXYGEN SATURATION: 95 % | HEART RATE: 75 BPM | HEART RATE: 87 BPM | OXYGEN SATURATION: 96 % | RESPIRATION RATE: 15 BRPM | DIASTOLIC BLOOD PRESSURE: 77 MMHG | RESPIRATION RATE: 15 BRPM | HEART RATE: 79 BPM | HEART RATE: 79 BPM | DIASTOLIC BLOOD PRESSURE: 74 MMHG | OXYGEN SATURATION: 98 % | DIASTOLIC BLOOD PRESSURE: 80 MMHG | RESPIRATION RATE: 16 BRPM | HEART RATE: 90 BPM | HEART RATE: 75 BPM | SYSTOLIC BLOOD PRESSURE: 132 MMHG | WEIGHT: 188 LBS | HEART RATE: 76 BPM | RESPIRATION RATE: 18 BRPM | WEIGHT: 188 LBS | DIASTOLIC BLOOD PRESSURE: 77 MMHG | OXYGEN SATURATION: 100 % | DIASTOLIC BLOOD PRESSURE: 74 MMHG | RESPIRATION RATE: 18 BRPM | WEIGHT: 188 LBS | HEART RATE: 87 BPM | RESPIRATION RATE: 18 BRPM | OXYGEN SATURATION: 95 % | SYSTOLIC BLOOD PRESSURE: 111 MMHG | HEART RATE: 90 BPM | OXYGEN SATURATION: 100 % | HEART RATE: 79 BPM | DIASTOLIC BLOOD PRESSURE: 80 MMHG | DIASTOLIC BLOOD PRESSURE: 74 MMHG | OXYGEN SATURATION: 98 % | HEIGHT: 66 IN | DIASTOLIC BLOOD PRESSURE: 74 MMHG | RESPIRATION RATE: 18 BRPM | HEART RATE: 87 BPM | TEMPERATURE: 96.8 F | OXYGEN SATURATION: 96 % | RESPIRATION RATE: 16 BRPM | HEART RATE: 90 BPM | OXYGEN SATURATION: 100 % | RESPIRATION RATE: 16 BRPM | HEIGHT: 66 IN | OXYGEN SATURATION: 95 % | HEART RATE: 90 BPM | DIASTOLIC BLOOD PRESSURE: 76 MMHG | DIASTOLIC BLOOD PRESSURE: 76 MMHG | OXYGEN SATURATION: 98 % | RESPIRATION RATE: 16 BRPM | HEART RATE: 72 BPM | OXYGEN SATURATION: 96 % | RESPIRATION RATE: 18 BRPM | HEART RATE: 90 BPM | DIASTOLIC BLOOD PRESSURE: 69 MMHG | DIASTOLIC BLOOD PRESSURE: 77 MMHG | DIASTOLIC BLOOD PRESSURE: 77 MMHG | SYSTOLIC BLOOD PRESSURE: 132 MMHG | HEART RATE: 87 BPM | DIASTOLIC BLOOD PRESSURE: 69 MMHG | SYSTOLIC BLOOD PRESSURE: 114 MMHG | SYSTOLIC BLOOD PRESSURE: 132 MMHG | SYSTOLIC BLOOD PRESSURE: 111 MMHG | HEIGHT: 66 IN | OXYGEN SATURATION: 96 % | DIASTOLIC BLOOD PRESSURE: 85 MMHG | SYSTOLIC BLOOD PRESSURE: 114 MMHG | RESPIRATION RATE: 16 BRPM | TEMPERATURE: 96.8 F | OXYGEN SATURATION: 100 % | DIASTOLIC BLOOD PRESSURE: 74 MMHG | SYSTOLIC BLOOD PRESSURE: 125 MMHG | DIASTOLIC BLOOD PRESSURE: 77 MMHG

## 2024-09-17 DIAGNOSIS — R13.10 DYSPHAGIA, UNSPECIFIED: ICD-10-CM

## 2024-09-17 DIAGNOSIS — K31.89 OTHER DISEASES OF STOMACH AND DUODENUM: ICD-10-CM

## 2024-09-17 LAB
GI HISTOLOGY: A. SECOND PART OF THE DUODENUM: (no result)
GI HISTOLOGY: PDF REPORT: (no result)

## 2024-09-17 PROCEDURE — 43450 DILATE ESOPHAGUS 1/MULT PASS: CPT | Performed by: INTERNAL MEDICINE

## 2024-09-17 PROCEDURE — 88305 TISSUE EXAM BY PATHOLOGIST: CPT | Performed by: PATHOLOGY

## 2024-09-17 PROCEDURE — 43239 EGD BIOPSY SINGLE/MULTIPLE: CPT | Performed by: INTERNAL MEDICINE

## 2024-11-12 ENCOUNTER — HOSPITAL ENCOUNTER (EMERGENCY)
Facility: HOSPITAL | Age: 64
Discharge: HOME | End: 2024-11-12
Admitting: EMERGENCY MEDICINE
Payer: COMMERCIAL

## 2024-11-12 ENCOUNTER — APPOINTMENT (OUTPATIENT)
Dept: GENERAL RADIOLOGY | Facility: HOSPITAL | Age: 64
End: 2024-11-12
Payer: COMMERCIAL

## 2024-11-12 VITALS
SYSTOLIC BLOOD PRESSURE: 130 MMHG | BODY MASS INDEX: 32.84 KG/M2 | HEART RATE: 84 BPM | RESPIRATION RATE: 16 BRPM | OXYGEN SATURATION: 95 % | WEIGHT: 204.37 LBS | TEMPERATURE: 98.2 F | HEIGHT: 66 IN | DIASTOLIC BLOOD PRESSURE: 73 MMHG

## 2024-11-12 DIAGNOSIS — M54.50 ACUTE LEFT-SIDED LOW BACK PAIN WITHOUT SCIATICA: Primary | ICD-10-CM

## 2024-11-12 PROCEDURE — 25010000002 ONDANSETRON PER 1 MG: Performed by: PHYSICIAN ASSISTANT

## 2024-11-12 PROCEDURE — 96374 THER/PROPH/DIAG INJ IV PUSH: CPT

## 2024-11-12 PROCEDURE — 99283 EMERGENCY DEPT VISIT LOW MDM: CPT

## 2024-11-12 PROCEDURE — 72100 X-RAY EXAM L-S SPINE 2/3 VWS: CPT

## 2024-11-12 PROCEDURE — 25010000002 DEXAMETHASONE SODIUM PHOSPHATE 10 MG/ML SOLUTION: Performed by: PHYSICIAN ASSISTANT

## 2024-11-12 PROCEDURE — 96375 TX/PRO/DX INJ NEW DRUG ADDON: CPT

## 2024-11-12 PROCEDURE — 25010000002 HYDROMORPHONE 1 MG/ML SOLUTION: Performed by: PHYSICIAN ASSISTANT

## 2024-11-12 RX ORDER — METHYLPREDNISOLONE 4 MG/1
TABLET ORAL
Qty: 21 TABLET | Refills: 0 | Status: SHIPPED | OUTPATIENT
Start: 2024-11-12

## 2024-11-12 RX ORDER — DEXAMETHASONE SODIUM PHOSPHATE 10 MG/ML
10 INJECTION, SOLUTION INTRAMUSCULAR; INTRAVENOUS ONCE
Status: COMPLETED | OUTPATIENT
Start: 2024-11-12 | End: 2024-11-12

## 2024-11-12 RX ORDER — OXYCODONE AND ACETAMINOPHEN 5; 325 MG/1; MG/1
1 TABLET ORAL EVERY 6 HOURS PRN
Qty: 12 TABLET | Refills: 0 | Status: SHIPPED | OUTPATIENT
Start: 2024-11-12

## 2024-11-12 RX ORDER — ONDANSETRON 2 MG/ML
4 INJECTION INTRAMUSCULAR; INTRAVENOUS ONCE
Status: COMPLETED | OUTPATIENT
Start: 2024-11-12 | End: 2024-11-12

## 2024-11-12 RX ADMIN — DEXAMETHASONE SODIUM PHOSPHATE 10 MG: 10 INJECTION, SOLUTION INTRAMUSCULAR; INTRAVENOUS at 17:04

## 2024-11-12 RX ADMIN — HYDROMORPHONE HYDROCHLORIDE 0.5 MG: 1 INJECTION, SOLUTION INTRAMUSCULAR; INTRAVENOUS; SUBCUTANEOUS at 17:03

## 2024-11-12 RX ADMIN — ONDANSETRON 4 MG: 2 INJECTION, SOLUTION INTRAMUSCULAR; INTRAVENOUS at 17:02

## 2024-11-12 NOTE — ED PROVIDER NOTES
"Subjective   History of Present Illness  64-year-old female presents emergency room with complaints of low back pain.  Patient states Saturday she was cleaning her abdomen bending over for long period of time patient states she does have history of low back pain does see a back specialist.  Denies any new numbness tingling or fecal urinary incontinence.  Patient states she has taken ibuprofen, Tylenol, tramadol and oxycodone since Saturday.        Review of Systems   Constitutional:  Negative for chills, fatigue and fever.   Gastrointestinal:  Negative for abdominal pain, nausea and vomiting.   Genitourinary:  Negative for dysuria, flank pain, frequency and hematuria.   Musculoskeletal:  Positive for back pain and gait problem.       Past Medical History:   Diagnosis Date    Bipolar disorder     Borderline diabetes     Cancer     cervix    Disease of thyroid gland     GERD (gastroesophageal reflux disease)     History of stomach ulcers     IBS (irritable bowel syndrome)     Idiopathic thrombocytopenic purpura (ITP)     Infected tooth     PT INSTRUCTED TO INFORM SURGEON    PALB2-related breast cancer     Pancreatic divisum     's papule     \"PICKERS WARTS\"    PONV (postoperative nausea and vomiting)     Sleep apnea        Allergies   Allergen Reactions    Lorazepam Anxiety    Amitriptyline Other (See Comments)     HYPERACTIVITY    Codeine Itching    Diphenhydramine Other (See Comments)     HYPERACTIVITY    Droperidol GI Intolerance    Hydrocodone GI Intolerance    Neosporin [Bacitracin-Polymyxin B] Dermatitis    Prozac [Fluoxetine] Other (See Comments)     \"MAKES ME MANIC\"       Past Surgical History:   Procedure Laterality Date    ANAL SPHINCTEROPLASTY      BACK SURGERY      CHOLECYSTECTOMY      COLONOSCOPY      ENDOSCOPY N/A 05/18/2020    Procedure: ESOPHAGOGASTRODUODENOSCOPY with biopsy x 2 areas;  Surgeon: Brad Bishop MD;  Location: [facility] ENDOSCOPY;  Service: Gastroenterology;  Laterality: N/A;  Post " "operative diagnosis: gastritis    ERCP      HYSTERECTOMY          NASAL SEPTUM SURGERY      ROTATOR CUFF REPAIR Right     TOTAL SHOULDER ARTHROPLASTY Right 2023    Procedure: TOTAL SHOULDER ARTHROPLASTY;  Surgeon: Wenceslao Thapa MD;  [facility];  Service: Orthopedics;  Laterality: Right;       Family History   Problem Relation Age of Onset    Malig Hyperthermia Neg Hx        Social History     Socioeconomic History    Marital status: Single   Tobacco Use    Smoking status: Former     Current packs/day: 0.00     Types: Cigarettes     Quit date:      Years since quittin.8     Passive exposure: Past    Smokeless tobacco: Never   Vaping Use    Vaping status: Never Used   Substance and Sexual Activity    Alcohol use: Not Currently    Drug use: Never    Sexual activity: Defer           Objective   Physical Exam  Vitals and nursing note reviewed.   Constitutional:       Appearance: Normal appearance.   HENT:      Head: Normocephalic and atraumatic.   Cardiovascular:      Rate and Rhythm: Normal rate and regular rhythm.   Pulmonary:      Effort: Pulmonary effort is normal.      Breath sounds: Normal breath sounds.   Musculoskeletal:      Lumbar back: Spasms and tenderness present. No bony tenderness. Decreased range of motion.   Neurological:      Mental Status: She is alert.         Procedures           ED Course    /73 (BP Location: Left arm, Patient Position: Lying)   Pulse 84   Temp 98.2 °F (36.8 °C) (Oral)   Resp 16   Ht 167.6 cm (66\")   Wt 92.7 kg (204 lb 5.9 oz)   SpO2 95%   BMI 32.99 kg/m²   Labs Reviewed - No data to display  Medications   HYDROmorphone (DILAUDID) injection 0.5 mg (0.5 mg Intravenous Given 24)   ondansetron (ZOFRAN) injection 4 mg (4 mg Intravenous Given 24)   dexAMETHasone sodium phosphate injection 10 mg (10 mg Intravenous Given 24)     .raday                  No data recorded                             Medical " Decision Making  64-year-old female presents emergency department complaints of back pain.  Patient states that she was cleaning out her up and bent over for long period time on Saturday.  Patient denies any falls heavy lifting or trauma.  States she has a bad back and this is normal for her.  Patient states she is taken off of her normal pain medications without relief.  Denies any fecal urinary incontinence denies any numbness tingling lower extremities.  Physical exam HEENT is normocephalic and nontraumatic neck supple full range of motion no C-spine tenderness or tenderness over T spine.  tenderness to palpation there is point tenderness over the lower left lumbar spine directly at belt line.  Possible SI joint pain versus discogenic pain.  Lower extremity DTRs are equal bilaterally.  Vitals BP is 130/73 temps 98.2 heart rate 74 respirations 16 SpO2 room air is 95%.  ER course x-rays of the lumbar spine obtained found to have grade 1 anterior thesis of L4-L5 multi lumbar discogenic disease with facet arthropathy.  Patient received Dilaudid 0.5 mg, Zofran 4 mg IV for pain control good pain control was obtained with this medication.  Patient was advised that there was no compression fractures of the lumbar spine due to the improvement of pain patient received a prescription for  oxycodone at her pharmacy.  Given 10 mg of Decadron steroid pack sent to the pharmacy she was discharged home in stable condition.  To follow-up with family doctor for further pain control.    Problems Addressed:  Acute left-sided low back pain without sciatica: complicated acute illness or injury    Amount and/or Complexity of Data Reviewed  Radiology: ordered. Decision-making details documented in ED Course.    Risk  Prescription drug management.        Final diagnoses:   Acute left-sided low back pain without sciatica       ED Disposition  ED Disposition       ED Disposition   Discharge    Condition   Stable    Comment   --                Kiley Cobian MD  [address] [facility] [address]  [place] [address] [phone]    Schedule an appointment as soon as possible for a visit       [facility] EMERGENCY DEPARTMENT  [facility] [facility] [address] [phone]    If symptoms worsen         Medication List        New Prescriptions      methylPREDNISolone 4 MG dose pack  Commonly known as: MEDROL  Take as directed on package instructions.            Changed      oxyCODONE-acetaminophen 5-325 MG per tablet  Commonly known as: PERCOCET  Take 1 tablet by mouth Every 6 (Six) Hours As Needed for Moderate Pain.  What changed: when to take this               Where to Get Your Medications        These medications were sent to [facility] - [place], [place] - [address] [place] [address] - [phone]  - [phone]   [address] [place] [address] [place] [address]      Phone: [phone]   methylPREDNISolone 4 MG dose pack  oxyCODONE-acetaminophen 5-325 MG per tablet            Uri Price, ANTONIA  11/12/24 9427

## 2024-11-12 NOTE — ED NOTES
Patient has used tens unit, motrin and tylenol, ice, and left over tramadol with little improvement.  She says that she can not find a position of comfort that will last very lone.  She had a left over oxycodone and that helped a little bit.

## 2025-01-28 ENCOUNTER — TELEPHONE (OUTPATIENT)
Dept: MAMMOGRAPHY | Facility: CLINIC | Age: 65
End: 2025-01-28
Payer: COMMERCIAL

## 2025-05-14 ENCOUNTER — TRANSCRIBE ORDERS (OUTPATIENT)
Age: 65
End: 2025-05-14
Payer: COMMERCIAL

## 2025-05-14 DIAGNOSIS — R73.03 PREDIABETES: Primary | ICD-10-CM

## 2025-05-14 DIAGNOSIS — E78.5 DYSLIPIDEMIA: ICD-10-CM

## (undated) DEVICE — DRSNG GZ PETROLTM XEROFORM CURAD 1X8IN STRL

## (undated) DEVICE — PK SHLDR OPN 40

## (undated) DEVICE — SUT VIC 0 CT1 36IN J946H

## (undated) DEVICE — GLV SURG SIGNATURE ESSENTIAL PF LTX SZ8

## (undated) DEVICE — BITEBLOCK ENDO W/STRAP 60F A/ LF DISP

## (undated) DEVICE — SKIN PREP TRAY W/CHG: Brand: MEDLINE INDUSTRIES, INC.

## (undated) DEVICE — MAT FLR ABSORBENT LG 4FT 10 2.5FT

## (undated) DEVICE — SHEET, DRAPE, SPLIT, STERILE: Brand: MEDLINE

## (undated) DEVICE — ZIP 16 SURGICAL SKIN CLOSURE DEVICE, PSA: Brand: ZIP 16 SURGICAL SKIN CLOSURE DEVICE

## (undated) DEVICE — DUAL CUT SAGITTAL BLADE

## (undated) DEVICE — PATIENT RETURN ELECTRODE, SINGLE-USE, CONTACT QUALITY MONITORING, ADULT, WITH 9FT CORD, FOR PATIENTS WEIGING OVER 33LBS. (15KG): Brand: MEGADYNE

## (undated) DEVICE — HANDPIECE SET WITH COAXIAL HIGH FLOW TIP AND SUCTION TUBE: Brand: INTERPULSE

## (undated) DEVICE — GLV SURG BIOGEL LTX PF 8

## (undated) DEVICE — IMPLANTABLE DEVICE
Type: IMPLANTABLE DEVICE | Site: SHOULDER | Status: NON-FUNCTIONAL
Brand: COMPREHENSIVE® REVERSE SHOULDER
Removed: 2023-11-29

## (undated) DEVICE — BNDG ELAS CO-FLEX SLF ADHR 6IN 5YD LF STRL

## (undated) DEVICE — PAPR PRNT PK SONY W RIBN UPC55

## (undated) DEVICE — IMMOB SHLDR PAD2 UNIV SM

## (undated) DEVICE — SINGLE-USE BIOPSY FORCEPS: Brand: RADIAL JAW 4

## (undated) DEVICE — PK ENDO GI 50